# Patient Record
Sex: MALE | Race: WHITE | NOT HISPANIC OR LATINO | Employment: UNEMPLOYED | URBAN - METROPOLITAN AREA
[De-identification: names, ages, dates, MRNs, and addresses within clinical notes are randomized per-mention and may not be internally consistent; named-entity substitution may affect disease eponyms.]

---

## 2023-03-17 LAB
ALANINE AMINOTRANSFERASE (SGPT) (U/L) IN SER/PLAS: 48 U/L (ref 10–52)
ALBUMIN (G/DL) IN SER/PLAS: 4.5 G/DL (ref 3.4–5)
ALKALINE PHOSPHATASE (U/L) IN SER/PLAS: 105 U/L (ref 33–120)
ANION GAP IN SER/PLAS: 16 MMOL/L (ref 10–20)
ASPARTATE AMINOTRANSFERASE (SGOT) (U/L) IN SER/PLAS: 40 U/L (ref 9–39)
BILIRUBIN TOTAL (MG/DL) IN SER/PLAS: 0.8 MG/DL (ref 0–1.2)
CALCIUM (MG/DL) IN SER/PLAS: 9.5 MG/DL (ref 8.6–10.3)
CARBON DIOXIDE, TOTAL (MMOL/L) IN SER/PLAS: 24 MMOL/L (ref 21–32)
CHLORIDE (MMOL/L) IN SER/PLAS: 102 MMOL/L (ref 98–107)
CHOLESTEROL (MG/DL) IN SER/PLAS: 132 MG/DL (ref 0–199)
CHOLESTEROL IN HDL (MG/DL) IN SER/PLAS: 49 MG/DL
CHOLESTEROL/HDL RATIO: 2.7
CREATININE (MG/DL) IN SER/PLAS: 1.02 MG/DL (ref 0.5–1.3)
ESTIMATED AVERAGE GLUCOSE FOR HBA1C: 120 MG/DL
GFR MALE: 88 ML/MIN/1.73M2
GLUCOSE (MG/DL) IN SER/PLAS: 135 MG/DL (ref 74–99)
HEMOGLOBIN A1C/HEMOGLOBIN TOTAL IN BLOOD: 5.8 %
LDL: 46 MG/DL (ref 0–99)
POTASSIUM (MMOL/L) IN SER/PLAS: 3.7 MMOL/L (ref 3.5–5.3)
PROTEIN TOTAL: 8 G/DL (ref 6.4–8.2)
SODIUM (MMOL/L) IN SER/PLAS: 138 MMOL/L (ref 136–145)
TRIGLYCERIDE (MG/DL) IN SER/PLAS: 184 MG/DL (ref 0–149)
UREA NITROGEN (MG/DL) IN SER/PLAS: 14 MG/DL (ref 6–23)
VLDL: 37 MG/DL (ref 0–40)

## 2023-08-16 PROBLEM — E78.1 HYPERTRIGLYCERIDEMIA: Chronic | Status: ACTIVE | Noted: 2023-08-16

## 2023-08-16 PROBLEM — K21.9 GERD (GASTROESOPHAGEAL REFLUX DISEASE): Status: ACTIVE | Noted: 2023-08-16

## 2023-08-16 PROBLEM — I10 BENIGN ESSENTIAL HYPERTENSION: Status: ACTIVE | Noted: 2023-08-16

## 2023-08-16 PROBLEM — R79.89 ELEVATED LFTS: Status: ACTIVE | Noted: 2023-08-16

## 2023-08-16 PROBLEM — R06.02 SHORTNESS OF BREATH: Status: ACTIVE | Noted: 2023-08-16

## 2023-08-16 PROBLEM — E78.5 HYPERLIPIDEMIA: Status: ACTIVE | Noted: 2023-08-16

## 2023-08-16 PROBLEM — I25.10 CAD IN NATIVE ARTERY: Status: ACTIVE | Noted: 2023-08-16

## 2023-08-16 PROBLEM — F41.9 ANXIETY DISORDER: Status: ACTIVE | Noted: 2023-08-16

## 2023-08-16 PROBLEM — R93.1 ELEVATED CORONARY ARTERY CALCIUM SCORE: Status: ACTIVE | Noted: 2023-08-16

## 2023-08-16 PROBLEM — G47.33 OBSTRUCTIVE SLEEP APNEA: Status: ACTIVE | Noted: 2023-08-16

## 2023-08-16 PROBLEM — Z98.61 S/P PTCA (PERCUTANEOUS TRANSLUMINAL CORONARY ANGIOPLASTY): Status: ACTIVE | Noted: 2023-08-16

## 2023-08-16 RX ORDER — LORATADINE 10 MG/1
1 TABLET ORAL DAILY
COMMUNITY
Start: 2017-12-13 | End: 2023-11-10 | Stop reason: WASHOUT

## 2023-08-16 RX ORDER — DAPAGLIFLOZIN 5 MG/1
1 TABLET, FILM COATED ORAL EVERY MORNING
COMMUNITY
End: 2023-12-28 | Stop reason: SDUPTHER

## 2023-08-16 RX ORDER — ATORVASTATIN CALCIUM 80 MG/1
1 TABLET, FILM COATED ORAL NIGHTLY
COMMUNITY
Start: 2018-11-27 | End: 2023-10-16 | Stop reason: SDUPTHER

## 2023-08-16 RX ORDER — LOSARTAN POTASSIUM AND HYDROCHLOROTHIAZIDE 25; 100 MG/1; MG/1
1 TABLET ORAL DAILY
COMMUNITY
Start: 2022-10-11 | End: 2023-10-16 | Stop reason: SDUPTHER

## 2023-08-16 RX ORDER — OMEPRAZOLE 20 MG/1
20 TABLET, DELAYED RELEASE ORAL DAILY
COMMUNITY
End: 2023-11-10 | Stop reason: WASHOUT

## 2023-08-16 RX ORDER — ASPIRIN 81 MG/1
1 TABLET ORAL DAILY
COMMUNITY
Start: 2018-11-27

## 2023-08-16 RX ORDER — FLUTICASONE PROPIONATE 50 MCG
2 SPRAY, SUSPENSION (ML) NASAL DAILY
COMMUNITY
Start: 2020-09-14

## 2023-08-16 RX ORDER — OMEPRAZOLE 40 MG/1
1 CAPSULE, DELAYED RELEASE ORAL DAILY
COMMUNITY
Start: 2018-08-09 | End: 2023-10-23 | Stop reason: WASHOUT

## 2023-08-16 RX ORDER — ALBUTEROL SULFATE 90 UG/1
1-2 AEROSOL, METERED RESPIRATORY (INHALATION)
COMMUNITY
Start: 2018-03-29 | End: 2023-11-10 | Stop reason: WASHOUT

## 2023-08-16 RX ORDER — MULTIVITAMIN
TABLET ORAL
COMMUNITY

## 2023-08-16 RX ORDER — CITALOPRAM 20 MG/1
1 TABLET, FILM COATED ORAL DAILY
COMMUNITY
Start: 2017-11-28

## 2023-08-16 RX ORDER — BUDESONIDE AND FORMOTEROL FUMARATE DIHYDRATE 160; 4.5 UG/1; UG/1
AEROSOL RESPIRATORY (INHALATION)
COMMUNITY

## 2023-08-17 RX ORDER — AMLODIPINE BESYLATE 5 MG/1
5 TABLET ORAL DAILY
COMMUNITY
End: 2023-12-08 | Stop reason: SDUPTHER

## 2023-10-04 ENCOUNTER — APPOINTMENT (OUTPATIENT)
Dept: CARDIOLOGY | Facility: CLINIC | Age: 53
End: 2023-10-04
Payer: COMMERCIAL

## 2023-10-13 ENCOUNTER — LAB (OUTPATIENT)
Dept: LAB | Facility: LAB | Age: 53
End: 2023-10-13
Payer: COMMERCIAL

## 2023-10-13 ENCOUNTER — APPOINTMENT (OUTPATIENT)
Dept: CARDIOLOGY | Facility: CLINIC | Age: 53
End: 2023-10-13
Payer: COMMERCIAL

## 2023-10-16 ENCOUNTER — LAB (OUTPATIENT)
Dept: LAB | Facility: LAB | Age: 53
End: 2023-10-16
Payer: MEDICARE

## 2023-10-16 DIAGNOSIS — R97.20 ELEVATED PROSTATE SPECIFIC ANTIGEN (PSA): ICD-10-CM

## 2023-10-16 DIAGNOSIS — R73.9 HYPERGLYCEMIA, UNSPECIFIED: ICD-10-CM

## 2023-10-16 DIAGNOSIS — I10 BENIGN ESSENTIAL HYPERTENSION: ICD-10-CM

## 2023-10-16 DIAGNOSIS — E78.5 HYPERLIPIDEMIA, UNSPECIFIED HYPERLIPIDEMIA TYPE: Primary | ICD-10-CM

## 2023-10-16 DIAGNOSIS — E78.5 HYPERLIPIDEMIA, UNSPECIFIED: Primary | ICD-10-CM

## 2023-10-16 LAB
ALBUMIN SERPL BCP-MCNC: 4.5 G/DL (ref 3.4–5)
ALP SERPL-CCNC: 118 U/L (ref 33–120)
ALT SERPL W P-5'-P-CCNC: 48 U/L (ref 10–52)
ANION GAP SERPL CALC-SCNC: 16 MMOL/L (ref 10–20)
AST SERPL W P-5'-P-CCNC: 36 U/L (ref 9–39)
BILIRUB SERPL-MCNC: 1 MG/DL (ref 0–1.2)
BUN SERPL-MCNC: 19 MG/DL (ref 6–23)
CALCIUM SERPL-MCNC: 10 MG/DL (ref 8.6–10.3)
CHLORIDE SERPL-SCNC: 99 MMOL/L (ref 98–107)
CHOLEST SERPL-MCNC: 130 MG/DL (ref 0–199)
CHOLESTEROL/HDL RATIO: 2.7
CO2 SERPL-SCNC: 28 MMOL/L (ref 21–32)
CREAT SERPL-MCNC: 0.92 MG/DL (ref 0.5–1.3)
EST. AVERAGE GLUCOSE BLD GHB EST-MCNC: 140 MG/DL
GFR SERPL CREATININE-BSD FRML MDRD: >90 ML/MIN/1.73M*2
GLUCOSE SERPL-MCNC: 96 MG/DL (ref 74–99)
HBA1C MFR BLD: 6.5 %
HDLC SERPL-MCNC: 47.9 MG/DL
LDLC SERPL CALC-MCNC: 44 MG/DL
NON HDL CHOLESTEROL: 82 MG/DL (ref 0–149)
POTASSIUM SERPL-SCNC: 4.3 MMOL/L (ref 3.5–5.3)
PROT SERPL-MCNC: 7.5 G/DL (ref 6.4–8.2)
PSA SERPL-MCNC: 0.34 NG/ML
SODIUM SERPL-SCNC: 139 MMOL/L (ref 136–145)
TRIGL SERPL-MCNC: 189 MG/DL (ref 0–149)
VLDL: 38 MG/DL (ref 0–40)

## 2023-10-16 PROCEDURE — 83036 HEMOGLOBIN GLYCOSYLATED A1C: CPT

## 2023-10-16 PROCEDURE — 80053 COMPREHEN METABOLIC PANEL: CPT

## 2023-10-16 PROCEDURE — 36415 COLL VENOUS BLD VENIPUNCTURE: CPT

## 2023-10-16 PROCEDURE — 80061 LIPID PANEL: CPT

## 2023-10-16 PROCEDURE — 84153 ASSAY OF PSA TOTAL: CPT

## 2023-10-16 RX ORDER — LOSARTAN POTASSIUM AND HYDROCHLOROTHIAZIDE 25; 100 MG/1; MG/1
1 TABLET ORAL DAILY
Qty: 90 TABLET | Refills: 1 | Status: SHIPPED | OUTPATIENT
Start: 2023-10-16 | End: 2024-03-07 | Stop reason: SDUPTHER

## 2023-10-16 RX ORDER — ATORVASTATIN CALCIUM 80 MG/1
80 TABLET, FILM COATED ORAL NIGHTLY
Qty: 90 TABLET | Refills: 1 | Status: SHIPPED | OUTPATIENT
Start: 2023-10-16

## 2023-10-23 DIAGNOSIS — R12 HEARTBURN: Primary | ICD-10-CM

## 2023-10-23 RX ORDER — OMEPRAZOLE 20 MG/1
20 CAPSULE, DELAYED RELEASE ORAL DAILY
Qty: 30 CAPSULE | Refills: 1 | Status: SHIPPED | OUTPATIENT
Start: 2023-10-23 | End: 2023-11-27

## 2023-11-01 PROBLEM — M53.3 SACROILIAC JOINT DYSFUNCTION OF BOTH SIDES: Status: ACTIVE | Noted: 2017-11-22

## 2023-11-01 PROBLEM — S72.001A FRACTURE OF FEMORAL NECK, RIGHT (MULTI): Status: ACTIVE | Noted: 2017-12-04

## 2023-11-01 PROBLEM — M54.41 CHRONIC BILATERAL LOW BACK PAIN WITH SCIATICA: Status: ACTIVE | Noted: 2017-11-22

## 2023-11-01 PROBLEM — Z98.61 STATUS POST PERCUTANEOUS TRANSLUMINAL CORONARY ANGIOPLASTY: Status: ACTIVE | Noted: 2019-01-02

## 2023-11-01 PROBLEM — M54.42 CHRONIC BILATERAL LOW BACK PAIN WITH SCIATICA: Status: ACTIVE | Noted: 2017-11-22

## 2023-11-01 PROBLEM — E66.01 OBESITY, CLASS III, BMI 40-49.9 (MORBID OBESITY) (MULTI): Status: ACTIVE | Noted: 2017-12-07

## 2023-11-01 PROBLEM — I25.10 ARTERIOSCLEROSIS OF CORONARY ARTERY: Status: ACTIVE | Noted: 2018-11-08

## 2023-11-01 PROBLEM — M87.052 AVASCULAR NECROSIS OF LEFT FEMORAL HEAD (MULTI): Status: ACTIVE | Noted: 2017-11-22

## 2023-11-01 PROBLEM — G89.29 CHRONIC BILATERAL LOW BACK PAIN WITH SCIATICA: Status: ACTIVE | Noted: 2017-11-22

## 2023-11-01 PROBLEM — K21.9 GASTROESOPHAGEAL REFLUX DISEASE: Status: ACTIVE | Noted: 2018-11-08

## 2023-11-01 PROBLEM — E66.813 OBESITY, CLASS III, BMI 40-49.9 (MORBID OBESITY): Status: ACTIVE | Noted: 2017-12-07

## 2023-11-01 PROBLEM — Z96.642 STATUS POST LEFT HIP REPLACEMENT: Status: ACTIVE | Noted: 2018-11-21

## 2023-11-01 PROBLEM — G47.33 OBSTRUCTIVE SLEEP APNEA SYNDROME: Status: ACTIVE | Noted: 2018-09-06

## 2023-11-01 PROBLEM — M54.40 CHRONIC BILATERAL LOW BACK PAIN WITH SCIATICA: Status: ACTIVE | Noted: 2017-11-22

## 2023-11-01 RX ORDER — CLONIDINE HYDROCHLORIDE 0.1 MG/1
0.1 TABLET ORAL 2 TIMES DAILY
COMMUNITY
Start: 2017-12-08 | End: 2024-05-30 | Stop reason: WASHOUT

## 2023-11-01 RX ORDER — VERAPAMIL HYDROCHLORIDE 240 MG/1
TABLET, FILM COATED, EXTENDED RELEASE ORAL
COMMUNITY
Start: 2023-01-03 | End: 2023-11-10 | Stop reason: WASHOUT

## 2023-11-01 RX ORDER — LOSARTAN POTASSIUM 100 MG/1
TABLET ORAL
COMMUNITY
Start: 2018-08-12 | End: 2023-11-10 | Stop reason: WASHOUT

## 2023-11-01 RX ORDER — DOCUSATE SODIUM 100 MG/1
100 CAPSULE, LIQUID FILLED ORAL 2 TIMES DAILY
COMMUNITY
Start: 2018-02-08 | End: 2023-11-10 | Stop reason: WASHOUT

## 2023-11-01 RX ORDER — HYDROCHLOROTHIAZIDE 25 MG/1
25 TABLET ORAL
COMMUNITY
End: 2023-11-10 | Stop reason: WASHOUT

## 2023-11-09 ASSESSMENT — ENCOUNTER SYMPTOMS
ALTERED MENTAL STATUS: 0
PALPITATIONS: 0
HEMATOCHEZIA: 0
HEMATURIA: 0
ORTHOPNEA: 0
VOMITING: 0
NEAR-SYNCOPE: 0
NAUSEA: 0
WHEEZING: 0
CHILLS: 0
FEVER: 0
SHORTNESS OF BREATH: 0
DYSPNEA ON EXERTION: 1
COUGH: 0
IRREGULAR HEARTBEAT: 0
SYNCOPE: 0

## 2023-11-09 NOTE — PATIENT INSTRUCTIONS
Recommend Mediterranean style of eating  Increase amlodipine 10 mg daily  Follow-up with Dr. Scherer or LOS Bingham in 3 months  If you have any questions or cardiac concerns, please call our office at [phone].

## 2023-11-09 NOTE — PROGRESS NOTES
"Chief Complaint/Reason for Visit:  Follow-up 6 month cardiovascular follow up    History Of Present Illness:    Beau Jackson is a 53 y.o. male that presents to the office for 6 month follow up.  Taking medications as prescribed.     PMH significant for CAD s/p PCI/stent RCA 11/8/18, hyperlipidemia, HTN and STEFANIA >uses CPAP.  Former smoker, quit in 2016.     He has chronic BUENO that is at baseline.  He reports that his BP at home has been \"steady\", but he was not able to give me any numbers.    During last office visit he was started on amlodipine.  He is unsure if he picked this up or started it.  MA called [facility] in [place] and he did  90 day supply on 9/19/23.  He reports that his girlfriend sets out his medications.  Reinforced to make up a good medication list with doses and frequency to keep on him.    Past Medical History:  He has a past medical history of Personal history of other diseases of the circulatory system.    Past Surgical History:  He has a past surgical history that includes Total hip arthroplasty (11/02/2018) and Other surgical history (09/09/2019).      Social History:  He reports that he has quit smoking. His smoking use included cigarettes. He has never used smokeless tobacco. No history on file for alcohol use and drug use.    Family History:  Family History   Problem Relation Name Age of Onset    Coronary artery disease Father          Allergies:  Patient has no known allergies.    Review of Systems   Constitutional: Negative for chills and fever.   Cardiovascular:  Positive for dyspnea on exertion (chronic). Negative for chest pain, irregular heartbeat, leg swelling, near-syncope, orthopnea, palpitations and syncope.   Respiratory:  Negative for cough, shortness of breath and wheezing.    Gastrointestinal:  Negative for hematochezia, melena, nausea and vomiting.   Genitourinary:  Negative for hematuria.   Psychiatric/Behavioral:  Negative for altered mental status.        Objective  "     Vitals reviewed.   Constitutional:       Appearance: Healthy appearance.   Pulmonary:      Effort: Pulmonary effort is normal.      Breath sounds: Normal breath sounds.   Cardiovascular:      PMI at left midclavicular line. Normal rate. Regular rhythm. S1 with normal intensity. S2 with normal intensity.       Murmurs: There is no murmur.   Edema:     Peripheral edema absent.   Abdominal:      General: Bowel sounds are normal.      Comments: +obese   Skin:     General: Skin is warm and dry.   Psychiatric:         Attention and Perception: Attention normal.         Mood and Affect: Mood normal.         Behavior: Behavior is cooperative.         Current Outpatient Medications   Medication Instructions    amLODIPine (NORVASC) 5 mg, oral, Daily    amLODIPine (NORVASC) 10 mg, oral, Daily    aspirin 81 mg EC tablet 1 tablet, oral, Daily    atorvastatin (LIPITOR) 80 mg, oral, Nightly, For: Hyperlipidemia    budesonide-formoteroL (Symbicort) 160-4.5 mcg/actuation inhaler inhalation    calcium carbonate-vitamin D3 600 mg-20 mcg (800 unit) tablet,chewable oral, 2 times daily    citalopram (CeleXA) 20 mg tablet 1 tablet, oral, Daily, For: Anxiety disorder    cloNIDine (CATAPRES) 0.1 mg, oral, 2 times daily    dapagliflozin propanediol (Farxiga) 5 mg 1 tablet, oral, Every morning, For: Benign essential hypertension, CAD in native artery     fluticasone (Flonase Allergy Relief) 50 mcg/actuation nasal spray 2 sprays, Each Nostril, Daily    losartan (Cozaar) 100 mg tablet oral, Daily RT    losartan-hydrochlorothiazide (Hyzaar) 100-25 mg tablet 1 tablet, oral, Daily, For: Benign essential hypertension     multivitamin (Daily Multi-Vitamin) tablet oral    omeprazole (PRILOSEC) 20 mg, oral, Daily        Last Labs:  CBC -  Lab Results   Component Value Date    WBC 8.3 08/31/2021    HGB 16.3 08/31/2021    HCT 45.9 08/31/2021    MCV 86 08/31/2021     08/31/2021       CMP -  Lab Results   Component Value Date    CALCIUM 10.0  "10/16/2023    PROT 7.5 10/16/2023    ALBUMIN 4.5 10/16/2023    AST 36 10/16/2023    ALT 48 10/16/2023    ALKPHOS 118 10/16/2023    BILITOT 1.0 10/16/2023       LIPID PANEL -   Lab Results   Component Value Date    CHOL 130 10/16/2023    TRIG 189 (H) 10/16/2023    HDL 47.9 10/16/2023    CHHDL 2.7 10/16/2023    LDLF 46 03/17/2023    VLDL 38 10/16/2023    NHDL 82 10/16/2023     Lab Results   Component Value Date    LDLCALC 44 10/16/2023       RENAL FUNCTION PANEL -   Lab Results   Component Value Date    GLUCOSE 96 10/16/2023     10/16/2023    K 4.3 10/16/2023    CL 99 10/16/2023    CO2 28 10/16/2023    ANIONGAP 16 10/16/2023    BUN 19 10/16/2023    CREATININE 0.92 10/16/2023    GFRMALE 88 03/17/2023    CALCIUM 10.0 10/16/2023    ALBUMIN 4.5 10/16/2023        Lab Results   Component Value Date    HGBA1C 6.5 (H) 10/16/2023       No results found for: \"TSH\"    No results found for this or any previous visit.     Last Cardiology Tests:    [facility] 11/8/18 showed 75% stenosis ostial RCA. He is s/p PCI/stent RCA.     Visit Vitals  BP (!) 140/100 (BP Location: Left arm, Patient Position: Sitting)   Pulse 103   Ht 1.651 m (5' 5\")   Wt 132 kg (291 lb)   SpO2 94%   BMI 48.42 kg/m²   Smoking Status Former   BSA 2.46 m²       Assessment/Plan   The primary encounter diagnosis was CAD in native artery. Diagnoses of Hyperlipidemia, unspecified hyperlipidemia type, Benign essential hypertension, and Hypertriglyceridemia were also pertinent to this visit.    1. CAD s/p PCI of RCA Nov. 2018  Continue aspirin 81 mg daily. Brilinta stopped previously.  Continue atorvastatin 80 mg daily  Counselled about diet and exercise     2. HTN  Elevated  Increase amlodipine 10 mg daily  Continue HCTZ 25 mg daily and losartan 100 mg daily      3. Hyperlipidemia, hypertriglyceridemia  Continue atorvastatin 80 mg daily  Goal LDL >70. He is at goal.   During last office visit he was started on Farxiga d/t hypertriglyceridemia and hyperglycemia with " hemoglobin A1c 6.9%    Otilia Gilliam, APRN-CNP

## 2023-11-10 ENCOUNTER — OFFICE VISIT (OUTPATIENT)
Dept: CARDIOLOGY | Facility: CLINIC | Age: 53
End: 2023-11-10
Payer: MEDICARE

## 2023-11-10 VITALS
BODY MASS INDEX: 48.48 KG/M2 | WEIGHT: 291 LBS | DIASTOLIC BLOOD PRESSURE: 100 MMHG | OXYGEN SATURATION: 94 % | SYSTOLIC BLOOD PRESSURE: 140 MMHG | HEART RATE: 103 BPM | HEIGHT: 65 IN

## 2023-11-10 DIAGNOSIS — E78.5 HYPERLIPIDEMIA, UNSPECIFIED HYPERLIPIDEMIA TYPE: ICD-10-CM

## 2023-11-10 DIAGNOSIS — I10 BENIGN ESSENTIAL HYPERTENSION: ICD-10-CM

## 2023-11-10 DIAGNOSIS — E78.1 HYPERTRIGLYCERIDEMIA: Chronic | ICD-10-CM

## 2023-11-10 DIAGNOSIS — I25.10 CAD IN NATIVE ARTERY: Primary | ICD-10-CM

## 2023-11-10 PROCEDURE — 3080F DIAST BP >= 90 MM HG: CPT | Performed by: NURSE PRACTITIONER

## 2023-11-10 PROCEDURE — 3077F SYST BP >= 140 MM HG: CPT | Performed by: NURSE PRACTITIONER

## 2023-11-10 PROCEDURE — 99213 OFFICE O/P EST LOW 20 MIN: CPT | Performed by: NURSE PRACTITIONER

## 2023-11-10 PROCEDURE — 1036F TOBACCO NON-USER: CPT | Performed by: NURSE PRACTITIONER

## 2023-11-10 RX ORDER — AMLODIPINE BESYLATE 10 MG/1
10 TABLET ORAL DAILY
Qty: 30 TABLET | Refills: 3 | Status: SHIPPED | OUTPATIENT
Start: 2023-11-10 | End: 2023-12-08 | Stop reason: SDUPTHER

## 2023-11-22 DIAGNOSIS — R12 HEARTBURN: ICD-10-CM

## 2023-11-27 RX ORDER — OMEPRAZOLE 20 MG/1
20 CAPSULE, DELAYED RELEASE ORAL DAILY
Qty: 90 CAPSULE | Refills: 0 | Status: SHIPPED | OUTPATIENT
Start: 2023-11-27 | End: 2024-04-11 | Stop reason: SDUPTHER

## 2023-12-08 DIAGNOSIS — I10 BENIGN ESSENTIAL HYPERTENSION: ICD-10-CM

## 2023-12-08 RX ORDER — AMLODIPINE BESYLATE 10 MG/1
10 TABLET ORAL DAILY
Qty: 90 TABLET | Refills: 3 | Status: SHIPPED | OUTPATIENT
Start: 2023-12-08 | End: 2024-12-07

## 2023-12-08 NOTE — TELEPHONE ENCOUNTER
Last appointment: 11/10/23 with Otilia Gilliam  Next appointment: 2/13/24 with Dr. Scherer  Labs labs:  10/16/23

## 2023-12-08 NOTE — TELEPHONE ENCOUNTER
----- Message from Sasha Wagner sent at 12/8/2023  1:02 PM EST -----  Needs a refill of amlodipine 10 mg sent to [facility] in [place]. :)

## 2023-12-28 DIAGNOSIS — I10 BENIGN ESSENTIAL HYPERTENSION: ICD-10-CM

## 2023-12-28 DIAGNOSIS — I25.10 CAD IN NATIVE ARTERY: Primary | ICD-10-CM

## 2023-12-28 DIAGNOSIS — R73.09 ELEVATED HEMOGLOBIN A1C: ICD-10-CM

## 2023-12-28 DIAGNOSIS — E78.1 HYPERTRIGLYCERIDEMIA: Primary | Chronic | ICD-10-CM

## 2023-12-28 RX ORDER — DAPAGLIFLOZIN 5 MG/1
5 TABLET, FILM COATED ORAL EVERY MORNING
Qty: 90 TABLET | Refills: 2 | Status: SHIPPED | OUTPATIENT
Start: 2023-12-28 | End: 2024-09-23

## 2023-12-28 NOTE — TELEPHONE ENCOUNTER
Patient left a voicemail request refill of Farxiga 5 mg to [facility] in [place]

## 2023-12-28 NOTE — TELEPHONE ENCOUNTER
Last appointment: 11/10/23 with Otilia Gilliam  Next appointment: 2/13/24 with Dr. Scherer  Labs labs: 10/16/23  3/13/23 last refilled 90 days 3 refills--I called pharmacy and medication is ready to , but there are no further refills left now.

## 2024-02-07 RX ORDER — AMLODIPINE BESYLATE 5 MG/1
5 TABLET ORAL
COMMUNITY
Start: 2023-12-15 | End: 2024-03-07 | Stop reason: WASHOUT

## 2024-02-13 ENCOUNTER — OFFICE VISIT (OUTPATIENT)
Dept: CARDIOLOGY | Facility: CLINIC | Age: 54
End: 2024-02-13
Payer: MEDICARE

## 2024-02-13 DIAGNOSIS — I25.10 CAD IN NATIVE ARTERY: ICD-10-CM

## 2024-02-13 DIAGNOSIS — E78.1 HYPERTRIGLYCERIDEMIA: Chronic | ICD-10-CM

## 2024-02-13 DIAGNOSIS — E78.5 HYPERLIPIDEMIA, UNSPECIFIED HYPERLIPIDEMIA TYPE: ICD-10-CM

## 2024-02-13 DIAGNOSIS — I10 BENIGN ESSENTIAL HYPERTENSION: ICD-10-CM

## 2024-02-14 NOTE — PROGRESS NOTES
"Counseling:  The patient was counseled regarding diagnostic results, instructions for management, risk factor reductions, prognosis, patient and family education, impressions, risks and benefits of treatment options and importance of compliance with treatment.      Chief Complaint:   The patient presents today for 4-month followup of HTN, CAD and hyperlipidemia.     History Of Present Illness:    Beau Jackson is a 53 year old male patient who presents today in the company of his wife for 4-month followup of HTN, CAD and hyperlipidemia. His PMH is significant for CAD s/p PCI/stent RCA 11/08/2018, hyperlipidemia, GERD, HTN and STEFANIA (CPAP). Over the past 4 months, the patient states that he has done well from a cardiac standpoint. He denies any CP, chest discomfort or SOB. BP has been stable. EKG today is stable with no acute changes. The patient is compliant with his prescribed medications; although, he cannot if he is still on the losartan-HCTZ and will verify this at home.      Last Recorded Vitals:  Vitals:    03/07/24 1159   BP: 130/90   BP Location: Left arm   Pulse: 94   Weight: 132 kg (291 lb)   Height: 1.651 m (5' 5\")       Past Surgical History:  He has a past surgical history that includes Total hip arthroplasty (11/02/2018) and Other surgical history (09/09/2019).      Social History:  He reports that he has quit smoking. His smoking use included cigarettes. He has never used smokeless tobacco. No history on file for alcohol use and drug use.    Family History:  Family History   Problem Relation Name Age of Onset    Coronary artery disease Father          Allergies:  Patient has no known allergies.    Outpatient Medications:  Current Outpatient Medications   Medication Instructions    amLODIPine (NORVASC) 10 mg, oral, Daily    amLODIPine (NORVASC) 5 mg    aspirin 81 mg EC tablet 1 tablet, oral, Daily    atorvastatin (LIPITOR) 80 mg, oral, Nightly, For: Hyperlipidemia    budesonide-formoteroL (Symbicort) " 160-4.5 mcg/actuation inhaler inhalation    calcium carbonate-vitamin D3 600 mg-20 mcg (800 unit) tablet,chewable oral, 2 times daily    citalopram (CeleXA) 20 mg tablet 1 tablet, oral, Daily, For: Anxiety disorder    cloNIDine (CATAPRES) 0.1 mg, oral, 2 times daily    dapagliflozin propanediol (FARXIGA) 5 mg, oral, Every morning, For: Benign essential hypertension, CAD in native artery    fluticasone (Flonase Allergy Relief) 50 mcg/actuation nasal spray 2 sprays, Each Nostril, Daily    losartan-hydrochlorothiazide (Hyzaar) 100-25 mg tablet 1 tablet, oral, Daily, For: Benign essential hypertension     multivitamin (Daily Multi-Vitamin) tablet oral    omeprazole (PRILOSEC) 20 mg, oral, Daily     Review of Systems   All other systems reviewed and are negative.     Physical Exam:  Constitutional:       Appearance: Healthy appearance. Not in distress.   Neck:      Vascular: No JVR. JVD normal.   Pulmonary:      Effort: Pulmonary effort is normal.      Breath sounds: Normal breath sounds. No wheezing. No rhonchi. No rales.   Chest:      Chest wall: Not tender to palpatation.   Cardiovascular:      PMI at left midclavicular line. Normal rate. Regular rhythm. Normal S1. Normal S2.       Murmurs: There is no murmur.      No gallop.  No click. No rub.   Pulses:     Intact distal pulses.   Edema:     Peripheral edema absent.   Abdominal:      General: Bowel sounds are normal.      Palpations: Abdomen is soft.      Tenderness: There is no abdominal tenderness.   Musculoskeletal: Normal range of motion.         General: No tenderness. Skin:     General: Skin is warm and dry.   Neurological:      General: No focal deficit present.      Mental Status: Alert and oriented to person, place and time.          Last Labs:  CBC -  Lab Results   Component Value Date    WBC 8.3 08/31/2021    HGB 16.3 08/31/2021    HCT 45.9 08/31/2021    MCV 86 08/31/2021     08/31/2021       CMP -  Lab Results   Component Value Date    CALCIUM 10.0  10/16/2023    PROT 7.5 10/16/2023    ALBUMIN 4.5 10/16/2023    AST 36 10/16/2023    ALT 48 10/16/2023    ALKPHOS 118 10/16/2023    BILITOT 1.0 10/16/2023       LIPID PANEL -   Lab Results   Component Value Date    CHOL 130 10/16/2023    TRIG 189 (H) 10/16/2023    HDL 47.9 10/16/2023    CHHDL 2.7 10/16/2023    LDLF 46 03/17/2023    VLDL 38 10/16/2023    NHDL 82 10/16/2023       RENAL FUNCTION PANEL -   Lab Results   Component Value Date    GLUCOSE 96 10/16/2023     10/16/2023    K 4.3 10/16/2023    CL 99 10/16/2023    CO2 28 10/16/2023    ANIONGAP 16 10/16/2023    BUN 19 10/16/2023    CREATININE 0.92 10/16/2023    GFRMALE 88 03/17/2023    CALCIUM 10.0 10/16/2023    ALBUMIN 4.5 10/16/2023        Lab Results   Component Value Date    HGBA1C 6.5 (H) 10/16/2023       Last Cardiology Tests:  11/08/2018 - Left Heart Catheterization  1. Single vessel coronary artery disease without proximal left anterior descending involvement.  2. Culprit vessel(s): right coronary artery.  3. Successful PTCA/stent of RCA.     Lab review: I have personally reviewed the laboratory result(s).    Assessment/Plan   1) CAD s/p PCI of RCA Nov. 2018  Continue aspirin 81 mg daily, atorvastatin 80 mg daily  Brilinta previously discontinued   Counselled about diet and exercise  Denies CP, chest discomfort or SOB  EKG stable  Check CBC, CMP, Lipid Panel, TSH, A1c  Followup with Otilia Gilliam NP, in 6 months     2) HTN  Stable  Continue amlodipine 10 mg daily, clonidine 0.1 mg BID, losartan-HCTZ 100-25 mg daily  Patient could not recall if he is still on losartan-HCTZ  - will verify at home  Check CMP  Followup with Otilia Gilliam NP, in 6 months     3) Hyperlipidemia  Goal LDL <70  Continue atorvastatin 80 mg daily, Farxiga 5 mg daily   Lipid panel 10/16/2023 with LDL of 44; at goal  A1c 10/16/2023 of 6.5; up from 5.8  Advised on Mediterranean style diet   Check Lipid Panel and A1c  Followup with Otilia Gilliam NP, in 6 months      Scribe  Attestation  By signing my name below, I, Ofelia Carmen   attest that this documentation has been prepared under the direction and in the presence of Steven Scherer MD.

## 2024-03-07 ENCOUNTER — OFFICE VISIT (OUTPATIENT)
Dept: CARDIOLOGY | Facility: CLINIC | Age: 54
End: 2024-03-07
Payer: MEDICARE

## 2024-03-07 VITALS
WEIGHT: 291 LBS | HEIGHT: 65 IN | SYSTOLIC BLOOD PRESSURE: 130 MMHG | BODY MASS INDEX: 48.48 KG/M2 | DIASTOLIC BLOOD PRESSURE: 90 MMHG | HEART RATE: 94 BPM

## 2024-03-07 DIAGNOSIS — I10 BENIGN ESSENTIAL HYPERTENSION: ICD-10-CM

## 2024-03-07 DIAGNOSIS — I25.10 CAD IN NATIVE ARTERY: Primary | ICD-10-CM

## 2024-03-07 DIAGNOSIS — E11.9 DIABETES MELLITUS TYPE II, NON INSULIN DEPENDENT (MULTI): ICD-10-CM

## 2024-03-07 PROCEDURE — 93000 ELECTROCARDIOGRAM COMPLETE: CPT | Performed by: INTERNAL MEDICINE

## 2024-03-07 PROCEDURE — 99213 OFFICE O/P EST LOW 20 MIN: CPT | Performed by: INTERNAL MEDICINE

## 2024-03-07 PROCEDURE — 3080F DIAST BP >= 90 MM HG: CPT | Performed by: INTERNAL MEDICINE

## 2024-03-07 PROCEDURE — 3075F SYST BP GE 130 - 139MM HG: CPT | Performed by: INTERNAL MEDICINE

## 2024-03-07 PROCEDURE — 1036F TOBACCO NON-USER: CPT | Performed by: INTERNAL MEDICINE

## 2024-03-07 RX ORDER — LOSARTAN POTASSIUM AND HYDROCHLOROTHIAZIDE 25; 100 MG/1; MG/1
1 TABLET ORAL DAILY
Qty: 90 TABLET | Refills: 1 | Status: SHIPPED | OUTPATIENT
Start: 2024-03-07

## 2024-03-07 NOTE — PATIENT INSTRUCTIONS
Continue all current medications as prescribed.  Your A1c has gone up from 5.8 to 6.5. Watch carbohydrate intake (rice, potatoes, pasta, bread, ice-cream all within moderation); increase greens, veggies, fiber, lean protein (fish, turkey, chicken; baked/boiled/grilled, not fried) and fruit.   Please have blood work drawn at your earliest convenience.  Followup with Otilia Gilliam NP, in 6 months.      If you have any questions or cardiac concerns, please call our office at [phone].

## 2024-03-07 NOTE — LETTER
"March 7, 2024     CAROLINE Vasquez MD  [address] [facility] [address]    Patient: Beau Jackson   YOB: 1970   Date of Visit: 3/7/2024       Dear Dr. CAROLINE Vasquez MD:    Thank you for referring Beau Jackson to me for evaluation. Below are my notes for this consultation.  If you have questions, please do not hesitate to call me. I look forward to following your patient along with you.       Sincerely,     Steven Scherer MD      CC: No Recipients  ______________________________________________________________________________________    Counseling:  The patient was counseled regarding diagnostic results, instructions for management, risk factor reductions, prognosis, patient and family education, impressions, risks and benefits of treatment options and importance of compliance with treatment.      Chief Complaint:   The patient presents today for 4-month followup of HTN, CAD and hyperlipidemia.     History Of Present Illness:    Beau Jackson is a 53 year old male patient who presents today in the company of his wife for 4-month followup of HTN, CAD and hyperlipidemia. His PMH is significant for CAD s/p PCI/stent RCA 11/08/2018, hyperlipidemia, GERD, HTN and STEFANIA (CPAP). Over the past 4 months, the patient states that he has done well from a cardiac standpoint. He denies any CP, chest discomfort or SOB. BP has been stable. EKG today is stable with no acute changes. The patient is compliant with his prescribed medications; although, he cannot if he is still on the losartan-HCTZ and will verify this at home.      Last Recorded Vitals:  Vitals:    03/07/24 1159   BP: 130/90   BP Location: Left arm   Pulse: 94   Weight: 132 kg (291 lb)   Height: 1.651 m (5' 5\")       Past Surgical History:  He has a past surgical history that includes Total hip arthroplasty (11/02/2018) and Other surgical history (09/09/2019).      Social History:  He reports that he has quit smoking. His smoking use included cigarettes. He has " never used smokeless tobacco. No history on file for alcohol use and drug use.    Family History:  Family History   Problem Relation Name Age of Onset   • Coronary artery disease Father          Allergies:  Patient has no known allergies.    Outpatient Medications:  Current Outpatient Medications   Medication Instructions   • amLODIPine (NORVASC) 10 mg, oral, Daily   • amLODIPine (NORVASC) 5 mg   • aspirin 81 mg EC tablet 1 tablet, oral, Daily   • atorvastatin (LIPITOR) 80 mg, oral, Nightly, For: Hyperlipidemia   • budesonide-formoteroL (Symbicort) 160-4.5 mcg/actuation inhaler inhalation   • calcium carbonate-vitamin D3 600 mg-20 mcg (800 unit) tablet,chewable oral, 2 times daily   • citalopram (CeleXA) 20 mg tablet 1 tablet, oral, Daily, For: Anxiety disorder   • cloNIDine (CATAPRES) 0.1 mg, oral, 2 times daily   • dapagliflozin propanediol (FARXIGA) 5 mg, oral, Every morning, For: Benign essential hypertension, CAD in native artery   • fluticasone (Flonase Allergy Relief) 50 mcg/actuation nasal spray 2 sprays, Each Nostril, Daily   • losartan-hydrochlorothiazide (Hyzaar) 100-25 mg tablet 1 tablet, oral, Daily, For: Benign essential hypertension    • multivitamin (Daily Multi-Vitamin) tablet oral   • omeprazole (PRILOSEC) 20 mg, oral, Daily     Review of Systems   All other systems reviewed and are negative.     Physical Exam:  Constitutional:       Appearance: Healthy appearance. Not in distress.   Neck:      Vascular: No JVR. JVD normal.   Pulmonary:      Effort: Pulmonary effort is normal.      Breath sounds: Normal breath sounds. No wheezing. No rhonchi. No rales.   Chest:      Chest wall: Not tender to palpatation.   Cardiovascular:      PMI at left midclavicular line. Normal rate. Regular rhythm. Normal S1. Normal S2.       Murmurs: There is no murmur.      No gallop.  No click. No rub.   Pulses:     Intact distal pulses.   Edema:     Peripheral edema absent.   Abdominal:      General: Bowel sounds are  normal.      Palpations: Abdomen is soft.      Tenderness: There is no abdominal tenderness.   Musculoskeletal: Normal range of motion.         General: No tenderness. Skin:     General: Skin is warm and dry.   Neurological:      General: No focal deficit present.      Mental Status: Alert and oriented to person, place and time.          Last Labs:  CBC -  Lab Results   Component Value Date    WBC 8.3 08/31/2021    HGB 16.3 08/31/2021    HCT 45.9 08/31/2021    MCV 86 08/31/2021     08/31/2021       CMP -  Lab Results   Component Value Date    CALCIUM 10.0 10/16/2023    PROT 7.5 10/16/2023    ALBUMIN 4.5 10/16/2023    AST 36 10/16/2023    ALT 48 10/16/2023    ALKPHOS 118 10/16/2023    BILITOT 1.0 10/16/2023       LIPID PANEL -   Lab Results   Component Value Date    CHOL 130 10/16/2023    TRIG 189 (H) 10/16/2023    HDL 47.9 10/16/2023    CHHDL 2.7 10/16/2023    LDLF 46 03/17/2023    VLDL 38 10/16/2023    NHDL 82 10/16/2023       RENAL FUNCTION PANEL -   Lab Results   Component Value Date    GLUCOSE 96 10/16/2023     10/16/2023    K 4.3 10/16/2023    CL 99 10/16/2023    CO2 28 10/16/2023    ANIONGAP 16 10/16/2023    BUN 19 10/16/2023    CREATININE 0.92 10/16/2023    GFRMALE 88 03/17/2023    CALCIUM 10.0 10/16/2023    ALBUMIN 4.5 10/16/2023        Lab Results   Component Value Date    HGBA1C 6.5 (H) 10/16/2023       Last Cardiology Tests:  11/08/2018 - Left Heart Catheterization  1. Single vessel coronary artery disease without proximal left anterior descending involvement.  2. Culprit vessel(s): right coronary artery.  3. Successful PTCA/stent of RCA.     Lab review: I have personally reviewed the laboratory result(s).    Assessment/Plan  1) CAD s/p PCI of RCA Nov. 2018  Continue aspirin 81 mg daily, atorvastatin 80 mg daily  Brilinta previously discontinued   Counselled about diet and exercise  Denies CP, chest discomfort or SOB  EKG stable  Check CBC, CMP, Lipid Panel, TSH, A1c  Followup with Otilia  LOS Gilliam, in 6 months     2) HTN  Stable  Continue amlodipine 10 mg daily, clonidine 0.1 mg BID, losartan-HCTZ 100-25 mg daily  Patient could not recall if he is still on losartan-HCTZ  - will verify at home  Check CMP  Followup with Otilia Gilliam NP, in 6 months     3) Hyperlipidemia  Goal LDL <70  Continue atorvastatin 80 mg daily, Farxiga 5 mg daily   Lipid panel 10/16/2023 with LDL of 44; at goal  A1c 10/16/2023 of 6.5; up from 5.8  Advised on Mediterranean style diet   Check Lipid Panel and A1c  Followup with Otilia Gilliam NP, in 6 months      Scribe Attestation  By signing my name below, I, Suha Wild Scrpierre   attest that this documentation has been prepared under the direction and in the presence of Steven Scherer MD.

## 2024-03-12 ENCOUNTER — LAB (OUTPATIENT)
Dept: LAB | Facility: LAB | Age: 54
End: 2024-03-12
Payer: MEDICARE

## 2024-03-12 DIAGNOSIS — I25.10 CAD IN NATIVE ARTERY: ICD-10-CM

## 2024-03-12 DIAGNOSIS — I10 BENIGN ESSENTIAL HYPERTENSION: ICD-10-CM

## 2024-03-12 DIAGNOSIS — R73.09 OTHER ABNORMAL GLUCOSE: ICD-10-CM

## 2024-03-12 DIAGNOSIS — E11.9 DIABETES MELLITUS TYPE II, NON INSULIN DEPENDENT (MULTI): ICD-10-CM

## 2024-03-12 DIAGNOSIS — R73.01 IMPAIRED FASTING GLUCOSE: Primary | ICD-10-CM

## 2024-03-12 LAB
ALBUMIN SERPL BCP-MCNC: 4.3 G/DL (ref 3.4–5)
ALP SERPL-CCNC: 107 U/L (ref 33–120)
ALT SERPL W P-5'-P-CCNC: 35 U/L (ref 10–52)
ANION GAP SERPL CALC-SCNC: 16 MMOL/L (ref 10–20)
AST SERPL W P-5'-P-CCNC: 33 U/L (ref 9–39)
BASOPHILS # BLD AUTO: 0.04 X10*3/UL (ref 0–0.1)
BASOPHILS NFR BLD AUTO: 0.6 %
BILIRUB SERPL-MCNC: 0.9 MG/DL (ref 0–1.2)
BUN SERPL-MCNC: 14 MG/DL (ref 6–23)
CALCIUM SERPL-MCNC: 9.6 MG/DL (ref 8.6–10.3)
CHLORIDE SERPL-SCNC: 100 MMOL/L (ref 98–107)
CHOLEST SERPL-MCNC: 124 MG/DL (ref 0–199)
CHOLESTEROL/HDL RATIO: 3.1
CO2 SERPL-SCNC: 25 MMOL/L (ref 21–32)
CREAT SERPL-MCNC: 0.85 MG/DL (ref 0.5–1.3)
EGFRCR SERPLBLD CKD-EPI 2021: >90 ML/MIN/1.73M*2
EOSINOPHIL # BLD AUTO: 0.27 X10*3/UL (ref 0–0.7)
EOSINOPHIL NFR BLD AUTO: 4.3 %
ERYTHROCYTE [DISTWIDTH] IN BLOOD BY AUTOMATED COUNT: 13.4 % (ref 11.5–14.5)
EST. AVERAGE GLUCOSE BLD GHB EST-MCNC: 151 MG/DL
GLUCOSE P FAST SERPL-MCNC: 144 MG/DL (ref 74–99)
GLUCOSE SERPL-MCNC: 144 MG/DL (ref 74–99)
HBA1C MFR BLD: 6.9 %
HCT VFR BLD AUTO: 50.1 % (ref 41–52)
HDLC SERPL-MCNC: 39.4 MG/DL
HGB BLD-MCNC: 17.5 G/DL (ref 13.5–17.5)
IMM GRANULOCYTES # BLD AUTO: 0.04 X10*3/UL (ref 0–0.7)
IMM GRANULOCYTES NFR BLD AUTO: 0.6 % (ref 0–0.9)
LDLC SERPL CALC-MCNC: 28 MG/DL
LYMPHOCYTES # BLD AUTO: 1.39 X10*3/UL (ref 1.2–4.8)
LYMPHOCYTES NFR BLD AUTO: 22.3 %
MCH RBC QN AUTO: 30.7 PG (ref 26–34)
MCHC RBC AUTO-ENTMCNC: 34.9 G/DL (ref 32–36)
MCV RBC AUTO: 88 FL (ref 80–100)
MONOCYTES # BLD AUTO: 0.61 X10*3/UL (ref 0.1–1)
MONOCYTES NFR BLD AUTO: 9.8 %
NEUTROPHILS # BLD AUTO: 3.87 X10*3/UL (ref 1.2–7.7)
NEUTROPHILS NFR BLD AUTO: 62.4 %
NON HDL CHOLESTEROL: 85 MG/DL (ref 0–149)
NRBC BLD-RTO: 0 /100 WBCS (ref 0–0)
PLATELET # BLD AUTO: 328 X10*3/UL (ref 150–450)
POTASSIUM SERPL-SCNC: 3.8 MMOL/L (ref 3.5–5.3)
PROT SERPL-MCNC: 7.5 G/DL (ref 6.4–8.2)
RBC # BLD AUTO: 5.7 X10*6/UL (ref 4.5–5.9)
SODIUM SERPL-SCNC: 137 MMOL/L (ref 136–145)
TRIGL SERPL-MCNC: 284 MG/DL (ref 0–149)
TSH SERPL-ACNC: 1.14 MIU/L (ref 0.44–3.98)
VLDL: 57 MG/DL (ref 0–40)
WBC # BLD AUTO: 6.2 X10*3/UL (ref 4.4–11.3)

## 2024-03-12 PROCEDURE — 83036 HEMOGLOBIN GLYCOSYLATED A1C: CPT

## 2024-03-12 PROCEDURE — 85025 COMPLETE CBC W/AUTO DIFF WBC: CPT

## 2024-03-12 PROCEDURE — 80061 LIPID PANEL: CPT

## 2024-03-12 PROCEDURE — 82947 ASSAY GLUCOSE BLOOD QUANT: CPT

## 2024-03-12 PROCEDURE — 84443 ASSAY THYROID STIM HORMONE: CPT

## 2024-03-12 PROCEDURE — 80053 COMPREHEN METABOLIC PANEL: CPT

## 2024-03-12 PROCEDURE — 36415 COLL VENOUS BLD VENIPUNCTURE: CPT

## 2024-03-13 ENCOUNTER — TELEPHONE (OUTPATIENT)
Dept: CARDIOLOGY | Facility: CLINIC | Age: 54
End: 2024-03-13
Payer: MEDICARE

## 2024-03-13 NOTE — TELEPHONE ENCOUNTER
----- Message from Sasha Wagner sent at 3/13/2024  4:30 PM EDT -----  Regarding: Pt call.  Pt said he was ccalling you back.  Please return call.

## 2024-03-13 NOTE — TELEPHONE ENCOUNTER
I called and spoke with patient and went over lab results and recommendations. He denies questions.

## 2024-03-13 NOTE — TELEPHONE ENCOUNTER
----- Message from Steven Scherer MD sent at 3/13/2024 10:14 AM EDT -----  Labs ok except Triglycerides being high - Need to be on a low carb diet

## 2024-03-24 DIAGNOSIS — R12 HEARTBURN: ICD-10-CM

## 2024-03-25 RX ORDER — OMEPRAZOLE 20 MG/1
20 CAPSULE, DELAYED RELEASE ORAL DAILY
Qty: 90 CAPSULE | Refills: 0 | OUTPATIENT
Start: 2024-03-25

## 2024-04-11 ENCOUNTER — OFFICE VISIT (OUTPATIENT)
Dept: GASTROENTEROLOGY | Facility: CLINIC | Age: 54
End: 2024-04-11
Payer: MEDICARE

## 2024-04-11 VITALS
HEIGHT: 65 IN | SYSTOLIC BLOOD PRESSURE: 121 MMHG | OXYGEN SATURATION: 91 % | DIASTOLIC BLOOD PRESSURE: 87 MMHG | WEIGHT: 288 LBS | HEART RATE: 93 BPM | BODY MASS INDEX: 47.98 KG/M2

## 2024-04-11 DIAGNOSIS — R12 HEARTBURN: ICD-10-CM

## 2024-04-11 PROCEDURE — 1036F TOBACCO NON-USER: CPT | Performed by: PHYSICIAN ASSISTANT

## 2024-04-11 PROCEDURE — 3074F SYST BP LT 130 MM HG: CPT | Performed by: PHYSICIAN ASSISTANT

## 2024-04-11 PROCEDURE — 3079F DIAST BP 80-89 MM HG: CPT | Performed by: PHYSICIAN ASSISTANT

## 2024-04-11 PROCEDURE — 99213 OFFICE O/P EST LOW 20 MIN: CPT | Performed by: PHYSICIAN ASSISTANT

## 2024-04-11 RX ORDER — OMEPRAZOLE 20 MG/1
20 CAPSULE, DELAYED RELEASE ORAL DAILY
Qty: 90 CAPSULE | Refills: 3 | Status: SHIPPED | OUTPATIENT
Start: 2024-04-11

## 2024-04-11 NOTE — PATIENT INSTRUCTIONS
Thank you for coming in for your appointment.    -Year of omeprazole sent  -Get EGD done due to intestinal metaplasia on last EGD. Call with questions or concerns.

## 2024-04-11 NOTE — ASSESSMENT & PLAN NOTE
Well controlled with omeprazole 20mg daily. He did have some intestinal metaplasia on biopsies in 2021. Repeat EGD recommended to ensure no changes.

## 2024-04-11 NOTE — PROGRESS NOTES
Subjective   Patient ID: Beau Jackson is a 53 y.o. male who presents for Follow-up (Medication follow up /Last OV 12/2022/Colon 3/2021 with Wojtaeusebiok).    Patient's PCP is Dr. Vasquez    PMH: CAD s/p stent in 2018, HTN, STEFANIA    HPI  Patient states that he has had a long standing history of GERD. Patient has been doing well on omeprazole 20mg daily. He denies unexplained weight loss, dysphagia, N/V, abdominal pain, change in bowel habits, melena, hematochezia. No family history of GI malignancy.    Prior GI evaluation:  EGD September 2021: medium hiatal hernia. Chronic gastritis seen. Biopsies showed mild chronic gastritis without H. pylori, dysplasia, or malignancy. There was some intestinal metaplasia.  Colonoscopy March 2021: 1 hyperplastic colon polyp removed    Review of Systems:  Constitutional: No reported fever, chills, or weight loss.  GI: No reported abdominal pain, diarrhea, constipation, change in bowel habits, nausea, or vomiting.    Medications:  Prior to Admission medications    Medication Sig Start Date End Date Taking? Authorizing Provider   amLODIPine (Norvasc) 10 mg tablet Take 1 tablet (10 mg) by mouth once daily. 12/8/23 12/7/24  JUDIE Lima-CNP   aspirin 81 mg EC tablet Take 1 tablet (81 mg) by mouth once daily. 11/27/18   Historical Provider, MD   atorvastatin (Lipitor) 80 mg tablet Take 1 tablet (80 mg) by mouth once daily at bedtime. For: Hyperlipidemia 10/16/23   JUDIE Lima-CNP   budesonide-formoteroL (Symbicort) 160-4.5 mcg/actuation inhaler Inhale.    Historical Provider, MD   calcium carbonate-vitamin D3 600 mg-20 mcg (800 unit) tablet,chewable Chew twice a day.    Historical Provider, MD   citalopram (CeleXA) 20 mg tablet Take 1 tablet (20 mg) by mouth once daily. For: Anxiety disorder 11/28/17   Historical Provider, MD   cloNIDine (Catapres) 0.1 mg tablet Take 1 tablet (0.1 mg) by mouth twice a day. 12/8/17   Historical Provider, MD   dapagliflozin propanediol  (Farxiga) 5 mg Take 1 tablet (5 mg) by mouth once daily in the morning. For: Benign essential hypertension, CAD in native artery 12/28/23 9/23/24  JUDIE Lima-CNP   fluticasone (Flonase Allergy Relief) 50 mcg/actuation nasal spray Administer 2 sprays into each nostril once daily. 9/14/20   Historical Provider, MD   losartan-hydrochlorothiazide (Hyzaar) 100-25 mg tablet Take 1 tablet by mouth once daily. For: Benign essential hypertension 3/7/24   Steven Scherer MD   multivitamin (Daily Multi-Vitamin) tablet Take by mouth.    Historical Provider, MD   omeprazole (PriLOSEC) 20 mg DR capsule TAKE 1 CAPSULE BY MOUTH EVERY DAY 11/27/23   Ryanne Virk PA-C       Allergies:  Patient has no known allergies.    Past Medical History:  He has a past medical history of Personal history of other diseases of the circulatory system.    Past Surgical History:  He has a past surgical history that includes Total hip arthroplasty (11/02/2018) and Other surgical history (09/09/2019).    Social History:  He reports that he has quit smoking. His smoking use included cigarettes. He has never used smokeless tobacco. He reports that he does not drink alcohol and does not use drugs.    Objective   Physical exam:  Constitutional: Well developed, well nourished 53 y.o. year old in no acute distress.   Skin: Warm and dry. Normal turgor. No rash, ulcer, trauma, jaundice.   Eyes: Pupils symmetric and reactive to light.  Respiratory: Clear to auscultation bilaterally. No wheezes, rhonchi, or rales heard.  Cardiovascular: Regular rate and rhythm. S1 and S2 appreciated and normal. No murmur, rub, or gallop heard.   Edema: No edema noted.  GI: Normal bowel sounds. Soft, non-distended, non-tender. No rebound or guarding present. No hepatomegaly or splenomegaly appreciated. Abdominal aorta not palpably abnormal.  Musculoskeletal: Limbs and Joints grossly normal. Full range of motion in major joint.   Neuro: Alert and oriented x 3.  "Cranial nerves 2-12 grossly intact.   Psych: Normal mood and affect.        Relevant Results Recent labs reviewed in the EMR.  Lab Results   Component Value Date    HGB 17.5 03/12/2024    HGB 16.3 08/31/2021    HGB 15.9 10/27/2020    HGB 16.0 10/11/2019    MCV 88 03/12/2024    MCV 86 08/31/2021    MCV 88 10/27/2020    MCV 90 10/11/2019     03/12/2024     08/31/2021     10/27/2020     10/11/2019       No results found for: \"FERRITIN\", \"IRON\"    Lab Results   Component Value Date     03/12/2024    K 3.8 03/12/2024     03/12/2024    BUN 14 03/12/2024    CREATININE 0.85 03/12/2024       Lab Results   Component Value Date    BILITOT 0.9 03/12/2024     Lab Results   Component Value Date    ALT 35 03/12/2024    ALT 48 10/16/2023    ALT 48 03/17/2023    ALT 39 08/09/2022    ALT 52 03/18/2022    AST 33 03/12/2024    AST 36 10/16/2023    AST 40 (H) 03/17/2023    AST 29 08/09/2022    AST 40 (H) 03/18/2022    ALKPHOS 107 03/12/2024    ALKPHOS 118 10/16/2023    ALKPHOS 105 03/17/2023    ALKPHOS 99 08/09/2022    ALKPHOS 98 03/18/2022       No results found for: \"CRP\"    No results found for: \"CALPS\"    Radiology: Reviewed imaging reviewed in the EMR.  No results found.    Assessment/Plan   Problem List Items Addressed This Visit             ICD-10-CM    Heartburn R12     Well controlled with omeprazole 20mg daily. He did have some intestinal metaplasia on biopsies in 2021. Repeat EGD recommended to ensure no changes.         Relevant Medications    omeprazole (PriLOSEC) 20 mg DR capsule    Other Relevant Orders    EGD         Ryanne Virk PA-C    "

## 2024-04-18 ENCOUNTER — ANESTHESIA EVENT (OUTPATIENT)
Dept: OPERATING ROOM | Facility: HOSPITAL | Age: 54
End: 2024-04-18
Payer: MEDICARE

## 2024-04-18 RX ORDER — ALBUTEROL SULFATE 0.83 MG/ML
2.5 SOLUTION RESPIRATORY (INHALATION) ONCE
Status: CANCELLED | OUTPATIENT
Start: 2024-05-03

## 2024-04-18 RX ORDER — SODIUM CHLORIDE, SODIUM LACTATE, POTASSIUM CHLORIDE, CALCIUM CHLORIDE 600; 310; 30; 20 MG/100ML; MG/100ML; MG/100ML; MG/100ML
50 INJECTION, SOLUTION INTRAVENOUS CONTINUOUS
Status: CANCELLED | OUTPATIENT
Start: 2024-05-03

## 2024-04-18 RX ORDER — FAMOTIDINE 10 MG/ML
20 INJECTION INTRAVENOUS ONCE
Status: CANCELLED | OUTPATIENT
Start: 2024-04-18 | End: 2024-04-18

## 2024-04-18 RX ORDER — METOCLOPRAMIDE HYDROCHLORIDE 5 MG/ML
10 INJECTION INTRAMUSCULAR; INTRAVENOUS ONCE
Status: CANCELLED | OUTPATIENT
Start: 2024-04-18 | End: 2024-04-18

## 2024-04-25 ENCOUNTER — TELEPHONE (OUTPATIENT)
Dept: PREADMISSION TESTING | Facility: HOSPITAL | Age: 54
End: 2024-04-25
Payer: MEDICARE

## 2024-05-03 ENCOUNTER — ANESTHESIA (OUTPATIENT)
Dept: OPERATING ROOM | Facility: HOSPITAL | Age: 54
End: 2024-05-03
Payer: MEDICARE

## 2024-05-03 ENCOUNTER — APPOINTMENT (OUTPATIENT)
Dept: OPERATING ROOM | Facility: HOSPITAL | Age: 54
End: 2024-05-03
Payer: MEDICARE

## 2024-05-21 NOTE — ANESTHESIA PREPROCEDURE EVALUATION
Patient: Beau Jackson    Procedure Information       Date/Time: 05/30/24 8311    Scheduled providers: Nic Holbrook DO    Procedure: EGD    Location: [facility] OR            Relevant Problems   Anesthesia (within normal limits)      Cardiac   (+) Arteriosclerosis of coronary artery   (+) Benign essential hypertension   (+) CAD in native artery   (+) Hyperlipidemia   (+) Hypertriglyceridemia      Pulmonary   (+) Obstructive sleep apnea syndrome      Neuro   (+) Anxiety disorder      GI   (+) Gastroesophageal reflux disease      Musculoskeletal   (+) Chronic bilateral low back pain with sciatica       Clinical information reviewed:                   NPO Detail:  No data recorded     Physical Exam    Airway  Mallampati: III  TM distance: >3 FB  Neck ROM: full     Cardiovascular - normal exam  Rhythm: regular  Rate: normal     Dental - normal exam     Pulmonary - normal exam     Abdominal   (+) obese           Anesthesia Plan    History of general anesthesia?: yes  History of complications of general anesthesia?: no    ASA 3     MAC     The patient is not a current smoker.    intravenous induction   Anesthetic plan and risks discussed with patient.  Use of blood products discussed with patient who consented to blood products.    Plan discussed with CRNA.      
Preoccupations

## 2024-05-30 ENCOUNTER — HOSPITAL ENCOUNTER (OUTPATIENT)
Dept: OPERATING ROOM | Facility: HOSPITAL | Age: 54
Discharge: HOME | End: 2024-05-30
Payer: MEDICARE

## 2024-05-30 ENCOUNTER — HOSPITAL ENCOUNTER (OUTPATIENT)
Dept: CARDIOLOGY | Facility: HOSPITAL | Age: 54
Discharge: HOME | End: 2024-05-30
Payer: MEDICARE

## 2024-05-30 VITALS
HEART RATE: 76 BPM | OXYGEN SATURATION: 98 % | DIASTOLIC BLOOD PRESSURE: 79 MMHG | BODY MASS INDEX: 47.48 KG/M2 | SYSTOLIC BLOOD PRESSURE: 111 MMHG | HEIGHT: 65 IN | RESPIRATION RATE: 16 BRPM | TEMPERATURE: 97.2 F | WEIGHT: 285 LBS

## 2024-05-30 DIAGNOSIS — R12 HEARTBURN: ICD-10-CM

## 2024-05-30 LAB
ATRIAL RATE: 82 BPM
P AXIS: 17 DEGREES
PR INTERVAL: 151 MS
Q ONSET: 252 MS
QRS COUNT: 13 BEATS
QRS DURATION: 90 MS
QT INTERVAL: 366 MS
QTC CALCULATION(BAZETT): 428 MS
QTC FREDERICIA: 406 MS
R AXIS: -39 DEGREES
T AXIS: 32 DEGREES
T OFFSET: 435 MS
VENTRICULAR RATE: 82 BPM

## 2024-05-30 PROCEDURE — 7100000002 HC RECOVERY ROOM TIME - EACH INCREMENTAL 1 MINUTE: Performed by: NURSE ANESTHETIST, CERTIFIED REGISTERED

## 2024-05-30 PROCEDURE — 43239 EGD BIOPSY SINGLE/MULTIPLE: CPT | Performed by: INTERNAL MEDICINE

## 2024-05-30 PROCEDURE — 2500000005 HC RX 250 GENERAL PHARMACY W/O HCPCS: Performed by: NURSE ANESTHETIST, CERTIFIED REGISTERED

## 2024-05-30 PROCEDURE — 7100000010 HC PHASE TWO TIME - EACH INCREMENTAL 1 MINUTE: Performed by: NURSE ANESTHETIST, CERTIFIED REGISTERED

## 2024-05-30 PROCEDURE — 3600000002 HC OR TIME - INITIAL BASE CHARGE - PROCEDURE LEVEL TWO: Performed by: NURSE ANESTHETIST, CERTIFIED REGISTERED

## 2024-05-30 PROCEDURE — 2500000005 HC RX 250 GENERAL PHARMACY W/O HCPCS: Performed by: ANESTHESIOLOGY

## 2024-05-30 PROCEDURE — 2500000004 HC RX 250 GENERAL PHARMACY W/ HCPCS (ALT 636 FOR OP/ED): Performed by: ANESTHESIOLOGY

## 2024-05-30 PROCEDURE — 3600000007 HC OR TIME - EACH INCREMENTAL 1 MINUTE - PROCEDURE LEVEL TWO: Performed by: NURSE ANESTHETIST, CERTIFIED REGISTERED

## 2024-05-30 PROCEDURE — 3700000002 HC GENERAL ANESTHESIA TIME - EACH INCREMENTAL 1 MINUTE: Performed by: NURSE ANESTHETIST, CERTIFIED REGISTERED

## 2024-05-30 PROCEDURE — 7100000009 HC PHASE TWO TIME - INITIAL BASE CHARGE: Performed by: NURSE ANESTHETIST, CERTIFIED REGISTERED

## 2024-05-30 PROCEDURE — 93005 ELECTROCARDIOGRAM TRACING: CPT | Mod: 59

## 2024-05-30 PROCEDURE — 7100000001 HC RECOVERY ROOM TIME - INITIAL BASE CHARGE: Performed by: NURSE ANESTHETIST, CERTIFIED REGISTERED

## 2024-05-30 PROCEDURE — 3700000001 HC GENERAL ANESTHESIA TIME - INITIAL BASE CHARGE: Performed by: NURSE ANESTHETIST, CERTIFIED REGISTERED

## 2024-05-30 PROCEDURE — 2500000004 HC RX 250 GENERAL PHARMACY W/ HCPCS (ALT 636 FOR OP/ED): Performed by: NURSE ANESTHETIST, CERTIFIED REGISTERED

## 2024-05-30 RX ORDER — SODIUM CHLORIDE, SODIUM LACTATE, POTASSIUM CHLORIDE, CALCIUM CHLORIDE 600; 310; 30; 20 MG/100ML; MG/100ML; MG/100ML; MG/100ML
50 INJECTION, SOLUTION INTRAVENOUS CONTINUOUS
Status: DISCONTINUED | OUTPATIENT
Start: 2024-05-30 | End: 2024-05-31 | Stop reason: HOSPADM

## 2024-05-30 RX ORDER — LIDOCAINE HCL/PF 100 MG/5ML
SYRINGE (ML) INTRAVENOUS AS NEEDED
Status: DISCONTINUED | OUTPATIENT
Start: 2024-05-30 | End: 2024-05-30

## 2024-05-30 RX ORDER — METOCLOPRAMIDE HYDROCHLORIDE 5 MG/ML
10 INJECTION INTRAMUSCULAR; INTRAVENOUS ONCE
Status: COMPLETED | OUTPATIENT
Start: 2024-05-30 | End: 2024-05-30

## 2024-05-30 RX ORDER — PROPOFOL 10 MG/ML
INJECTION, EMULSION INTRAVENOUS AS NEEDED
Status: DISCONTINUED | OUTPATIENT
Start: 2024-05-30 | End: 2024-05-30

## 2024-05-30 RX ORDER — ALBUTEROL SULFATE 0.83 MG/ML
2.5 SOLUTION RESPIRATORY (INHALATION) ONCE
Status: DISCONTINUED | OUTPATIENT
Start: 2024-05-30 | End: 2024-05-30 | Stop reason: HOSPADM

## 2024-05-30 RX ORDER — FAMOTIDINE 10 MG/ML
20 INJECTION INTRAVENOUS ONCE
Status: COMPLETED | OUTPATIENT
Start: 2024-05-30 | End: 2024-05-30

## 2024-05-30 RX ORDER — FENTANYL CITRATE 50 UG/ML
INJECTION, SOLUTION INTRAMUSCULAR; INTRAVENOUS AS NEEDED
Status: DISCONTINUED | OUTPATIENT
Start: 2024-05-30 | End: 2024-05-30

## 2024-05-30 RX ADMIN — FENTANYL CITRATE 50 MCG: 50 INJECTION INTRAMUSCULAR; INTRAVENOUS at 09:03

## 2024-05-30 RX ADMIN — FAMOTIDINE 20 MG: 10 INJECTION, SOLUTION INTRAVENOUS at 08:01

## 2024-05-30 RX ADMIN — Medication 3 L/MIN: at 08:00

## 2024-05-30 RX ADMIN — LIDOCAINE HYDROCHLORIDE 100 MG: 20 INJECTION, SOLUTION INTRAVENOUS at 09:03

## 2024-05-30 RX ADMIN — SODIUM CHLORIDE, POTASSIUM CHLORIDE, SODIUM LACTATE AND CALCIUM CHLORIDE 50 ML/HR: 600; 310; 30; 20 INJECTION, SOLUTION INTRAVENOUS at 08:01

## 2024-05-30 RX ADMIN — METOCLOPRAMIDE 10 MG: 5 INJECTION, SOLUTION INTRAMUSCULAR; INTRAVENOUS at 08:01

## 2024-05-30 RX ADMIN — PROPOFOL 50 MG: 10 INJECTION, EMULSION INTRAVENOUS at 09:07

## 2024-05-30 RX ADMIN — PROPOFOL 100 MG: 10 INJECTION, EMULSION INTRAVENOUS at 09:03

## 2024-05-30 SDOH — HEALTH STABILITY: MENTAL HEALTH: CURRENT SMOKER: 0

## 2024-05-30 ASSESSMENT — PAIN SCALES - GENERAL
PAINLEVEL_OUTOF10: 0 - NO PAIN
PAIN_LEVEL: 0
PAINLEVEL_OUTOF10: 0 - NO PAIN

## 2024-05-30 ASSESSMENT — COLUMBIA-SUICIDE SEVERITY RATING SCALE - C-SSRS
2. HAVE YOU ACTUALLY HAD ANY THOUGHTS OF KILLING YOURSELF?: NO
6. HAVE YOU EVER DONE ANYTHING, STARTED TO DO ANYTHING, OR PREPARED TO DO ANYTHING TO END YOUR LIFE?: NO
1. IN THE PAST MONTH, HAVE YOU WISHED YOU WERE DEAD OR WISHED YOU COULD GO TO SLEEP AND NOT WAKE UP?: NO

## 2024-05-30 ASSESSMENT — PAIN - FUNCTIONAL ASSESSMENT
PAIN_FUNCTIONAL_ASSESSMENT: 0-10
PAIN_FUNCTIONAL_ASSESSMENT: 0-10

## 2024-05-30 NOTE — H&P
History Of Present Illness  Beau Jackson is a 53 y.o. male presenting for egd for eval of heartburn.     Past Medical History  Past Medical History:   Diagnosis Date    Personal history of other diseases of the circulatory system     History of coronary artery disease       Surgical History  Past Surgical History:   Procedure Laterality Date    OTHER SURGICAL HISTORY  09/09/2019    Coronary artery stent placement    TOTAL HIP ARTHROPLASTY  11/02/2018    Hip Replacement        Social History  He reports that he has quit smoking. His smoking use included cigarettes. He has never used smokeless tobacco. He reports that he does not drink alcohol and does not use drugs.    Family History  Family History   Problem Relation Name Age of Onset    Coronary artery disease Father          Allergies  Patient has no known allergies.    Review of Systems     Physical Exam  Constitutional:       Appearance: Normal appearance.   HENT:      Mouth/Throat:      Mouth: Mucous membranes are moist.   Eyes:      Extraocular Movements: Extraocular movements intact.   Cardiovascular:      Rate and Rhythm: Normal rate and regular rhythm.      Heart sounds: Normal heart sounds.   Pulmonary:      Effort: Pulmonary effort is normal. No respiratory distress.      Breath sounds: Normal breath sounds. No wheezing.   Abdominal:      General: Abdomen is flat. Bowel sounds are normal. There is no distension.      Palpations: Abdomen is soft.      Tenderness: There is no abdominal tenderness. There is no rebound.   Musculoskeletal:         General: Normal range of motion.   Skin:     General: Skin is warm and dry.   Neurological:      General: No focal deficit present.      Mental Status: He is alert and oriented to person, place, and time. Mental status is at baseline.   Psychiatric:         Mood and Affect: Mood normal.         Behavior: Behavior normal.          Last Recorded Vitals  Blood pressure (!) 136/93, pulse 84, temperature 36.4 °C  "(97.6 °F), temperature source Temporal, resp. rate 10, height 1.651 m (5' 5\"), weight 129 kg (285 lb), SpO2 93%.    Relevant Results        Current Outpatient Medications   Medication Instructions    amLODIPine (NORVASC) 10 mg, oral, Daily    aspirin 81 mg EC tablet 1 tablet, oral, Daily    atorvastatin (LIPITOR) 80 mg, oral, Nightly, For: Hyperlipidemia    budesonide-formoteroL (Symbicort) 160-4.5 mcg/actuation inhaler inhalation    citalopram (CeleXA) 20 mg tablet 1 tablet, oral, Daily, For: Anxiety disorder    dapagliflozin propanediol (FARXIGA) 5 mg, oral, Every morning, For: Benign essential hypertension, CAD in native artery    fluticasone (Flonase Allergy Relief) 50 mcg/actuation nasal spray 2 sprays, Each Nostril, Daily    losartan-hydrochlorothiazide (Hyzaar) 100-25 mg tablet 1 tablet, oral, Daily, For: Benign essential hypertension     multivitamin (Daily Multi-Vitamin) tablet oral    omeprazole (PRILOSEC) 20 mg, oral, Daily          Assessment/Plan   Active Problems:  There are no active Hospital Problems.  EGD for eval of reflux '    Risk and benefits of the endoscopic procedure including bleeding perforation and infection were discussed with patient and they wish to proceed          Nic Holbrook DO    "

## 2024-05-30 NOTE — LETTER
[facility]  [address] [id number] [phone]  FAX  [phone]      June 13, 2024     Beau Jackson  [address]  [place] [address]      Dear Beau:    Below are the results from your recent visit:    The biopsies from your recent endoscopy showed nonspecific irritation in the stomach (gastritis) with no concerning findings including no H pylori infection.     If you have any questions please call my office at [phone].    Sincerely,        Nic Holbrook, DO

## 2024-05-30 NOTE — ANESTHESIA POSTPROCEDURE EVALUATION
Patient: Beau Jackson    Procedure Summary       Date: 05/30/24 Room / Location: [facility] OR    Anesthesia Start: 0900 Anesthesia Stop: 0917    Procedure: EGD Diagnosis: Heartburn    Scheduled Providers: Nic Holbrook DO Responsible Provider: FESTUS Saunders    Anesthesia Type: MAC ASA Status: 2            Anesthesia Type: MAC    Vitals Value Taken Time   /79 05/30/24 0952   Temp 36.2 °C (97.2 °F) 05/30/24 0914   Pulse 76 05/30/24 0954   Resp 13 05/30/24 0955   SpO2 96 % 05/30/24 0954   Vitals shown include unfiled device data.    Anesthesia Post Evaluation    Patient location during evaluation: PACU  Patient participation: complete - patient participated  Level of consciousness: awake  Pain score: 0  Pain management: adequate  Airway patency: patent  Cardiovascular status: acceptable  Respiratory status: acceptable  Hydration status: acceptable  Postoperative Nausea and Vomiting: none    There were no known notable events for this encounter.

## 2024-06-13 ENCOUNTER — TELEPHONE (OUTPATIENT)
Dept: GASTROENTEROLOGY | Facility: CLINIC | Age: 54
End: 2024-06-13
Payer: MEDICARE

## 2024-06-13 LAB
LAB AP ASR DISCLAIMER: NORMAL
LABORATORY COMMENT REPORT: NORMAL
PATH REPORT.ADDENDUM SPEC: NORMAL
PATH REPORT.FINAL DX SPEC: NORMAL
PATH REPORT.GROSS SPEC: NORMAL
PATH REPORT.RELEVANT HX SPEC: NORMAL
PATH REPORT.TOTAL CANCER: NORMAL

## 2024-06-13 NOTE — RESULT ENCOUNTER NOTE
The biopsies from your recent endoscopy showed nonspecific irritation in the stomach (gastritis) with no concerning findings including no H pylori infection.    If you have any questions please call my office at [phone].

## 2024-06-13 NOTE — TELEPHONE ENCOUNTER
----- Message from Nic Holbrook DO sent at 6/13/2024 12:10 PM EDT -----    The biopsies from your recent endoscopy showed nonspecific irritation in the stomach (gastritis) with no concerning findings including no H pylori infection.    If you have any questions please call my office at [phone].

## 2024-06-21 DIAGNOSIS — E78.5 HYPERLIPIDEMIA, UNSPECIFIED HYPERLIPIDEMIA TYPE: ICD-10-CM

## 2024-06-24 RX ORDER — ATORVASTATIN CALCIUM 80 MG/1
80 TABLET, FILM COATED ORAL NIGHTLY
Qty: 90 TABLET | Refills: 2 | Status: SHIPPED | OUTPATIENT
Start: 2024-06-24

## 2024-06-29 ENCOUNTER — LAB (OUTPATIENT)
Dept: LAB | Facility: LAB | Age: 54
End: 2024-06-29
Payer: MEDICARE

## 2024-06-29 DIAGNOSIS — E11.9 TYPE 2 DIABETES MELLITUS WITHOUT COMPLICATIONS (MULTI): Primary | ICD-10-CM

## 2024-06-29 LAB
CHOLEST SERPL-MCNC: 112 MG/DL (ref 0–199)
CHOLESTEROL/HDL RATIO: 2.7
EST. AVERAGE GLUCOSE BLD GHB EST-MCNC: 137 MG/DL
HBA1C MFR BLD: 6.4 %
HDLC SERPL-MCNC: 42.1 MG/DL
LDLC SERPL CALC-MCNC: 50 MG/DL
NON HDL CHOLESTEROL: 70 MG/DL (ref 0–149)
TRIGL SERPL-MCNC: 102 MG/DL (ref 0–149)
VLDL: 20 MG/DL (ref 0–40)

## 2024-06-29 PROCEDURE — 83036 HEMOGLOBIN GLYCOSYLATED A1C: CPT

## 2024-06-29 PROCEDURE — 36415 COLL VENOUS BLD VENIPUNCTURE: CPT

## 2024-06-29 PROCEDURE — 80061 LIPID PANEL: CPT

## 2024-08-24 DIAGNOSIS — I10 BENIGN ESSENTIAL HYPERTENSION: ICD-10-CM

## 2024-08-26 DIAGNOSIS — I10 BENIGN ESSENTIAL HYPERTENSION: ICD-10-CM

## 2024-08-26 RX ORDER — LOSARTAN POTASSIUM AND HYDROCHLOROTHIAZIDE 25; 100 MG/1; MG/1
1 TABLET ORAL DAILY
Qty: 90 TABLET | Refills: 1 | OUTPATIENT
Start: 2024-08-26

## 2024-08-26 RX ORDER — LOSARTAN POTASSIUM AND HYDROCHLOROTHIAZIDE 25; 100 MG/1; MG/1
1 TABLET ORAL DAILY
Qty: 90 TABLET | Refills: 1 | Status: SHIPPED | OUTPATIENT
Start: 2024-08-26

## 2024-09-11 ENCOUNTER — TELEPHONE (OUTPATIENT)
Dept: CARDIOLOGY | Facility: HOSPITAL | Age: 54
End: 2024-09-11
Payer: MEDICARE

## 2024-09-12 NOTE — PROGRESS NOTES
"Primary Cardiologist: Dr. Scherer    Chief Complaint:   6 month follow up     History Of Present Illness:    Beau Jackson is a 54 y.o. male with past medical history of  CAD s/p PCI/stent RCA 11/08/2018, hyperlipidemia, GERD, HTN and STEFANIA (CPAP) who presents for 6 month follow up.     Today, Beau Jackson is feeling    Last Recorded Vitals:  Visit Vitals  /88 (BP Location: Left arm, Patient Position: Sitting, BP Cuff Size: Adult)   Pulse 106   Ht 1.651 m (5' 5\")   Wt 129 kg (285 lb)   SpO2 95%   BMI 47.43 kg/m²   Smoking Status Former   BSA 2.43 m²        Past Medical History:  He has a past medical history of Personal history of other diseases of the circulatory system.    Past Surgical History:  He has a past surgical history that includes Total hip arthroplasty (11/02/2018) and Other surgical history (09/09/2019).      Social History:  He reports that he has quit smoking. His smoking use included cigarettes. He has never used smokeless tobacco. He reports that he does not drink alcohol and does not use drugs.    Family History:  Family History   Problem Relation Name Age of Onset    Coronary artery disease Father          Allergies:  Patient has no known allergies.    Outpatient Medications:  Current Outpatient Medications   Medication Instructions    amLODIPine (NORVASC) 10 mg, oral, Daily    aspirin 81 mg EC tablet 1 tablet, oral, Daily    atorvastatin (LIPITOR) 80 mg, oral, Nightly    budesonide-formoteroL (Symbicort) 160-4.5 mcg/actuation inhaler inhalation    citalopram (CeleXA) 20 mg tablet 1 tablet, oral, Daily, For: Anxiety disorder    dapagliflozin propanediol (FARXIGA) 5 mg, oral, Every morning, For: Benign essential hypertension, CAD in native artery    fluticasone (Flonase Allergy Relief) 50 mcg/actuation nasal spray 2 sprays, Each Nostril, Daily    losartan-hydrochlorothiazide (Hyzaar) 100-25 mg tablet 1 tablet, oral, Daily, For: Benign essential hypertension     multivitamin (Daily " Multi-Vitamin) tablet oral    omeprazole (PRILOSEC) 20 mg, oral, Daily         Review of Systems   Constitutional: Negative for malaise/fatigue.   HENT: Negative.     Eyes: Negative.    Cardiovascular:  Negative for chest pain, dyspnea on exertion, leg swelling, near-syncope, palpitations and syncope.   Respiratory:  Negative for shortness of breath.    Endocrine: Negative.    Hematologic/Lymphatic: Negative.    Skin: Negative.    Musculoskeletal:  Positive for arthritis and back pain.   Gastrointestinal: Negative.    Genitourinary: Negative.    Neurological:  Negative for dizziness and light-headedness.   Psychiatric/Behavioral: Negative.     Allergic/Immunologic: Negative.         Physical Exam  Vitals reviewed.   Constitutional:       Appearance: Normal appearance. He is obese.   HENT:      Head: Normocephalic.      Mouth/Throat:      Mouth: Mucous membranes are moist.   Cardiovascular:      Rate and Rhythm: Regular rhythm. Tachycardia present.      Pulses: Normal pulses.      Heart sounds: Normal heart sounds.   Pulmonary:      Effort: Pulmonary effort is normal.      Breath sounds: Normal breath sounds. No wheezing, rhonchi or rales.   Abdominal:      General: Bowel sounds are normal. There is no distension.      Palpations: Abdomen is soft.      Tenderness: There is no abdominal tenderness.   Musculoskeletal:      Cervical back: Normal range of motion.      Right lower leg: No edema.      Left lower leg: No edema.   Skin:     General: Skin is warm and dry.   Neurological:      General: No focal deficit present.      Mental Status: He is alert and oriented to person, place, and time.   Psychiatric:         Mood and Affect: Mood normal.         Behavior: Behavior normal.           Last Labs:  CBC -  Lab Results   Component Value Date    WBC 6.2 03/12/2024    HGB 17.5 03/12/2024    HCT 50.1 03/12/2024    MCV 88 03/12/2024     03/12/2024       CMP -  Lab Results   Component Value Date    CALCIUM 9.6  "03/12/2024    PROT 7.5 03/12/2024    ALBUMIN 4.3 03/12/2024    AST 33 03/12/2024    ALT 35 03/12/2024    ALKPHOS 107 03/12/2024    BILITOT 0.9 03/12/2024       LIPID PANEL -   Lab Results   Component Value Date    CHOL 112 06/29/2024    TRIG 102 06/29/2024    HDL 42.1 06/29/2024    CHHDL 2.7 06/29/2024    LDLF 46 03/17/2023    VLDL 20 06/29/2024    NHDL 70 06/29/2024       RENAL FUNCTION PANEL -   Lab Results   Component Value Date    GLUCOSE 144 (H) 03/12/2024     03/12/2024    K 3.8 03/12/2024     03/12/2024    CO2 25 03/12/2024    ANIONGAP 16 03/12/2024    BUN 14 03/12/2024    CREATININE 0.85 03/12/2024    GFRMALE 88 03/17/2023    CALCIUM 9.6 03/12/2024    ALBUMIN 4.3 03/12/2024        Lab Results   Component Value Date    HGBA1C 6.4 (H) 06/29/2024       Last Cardiology Tests:  ECG:  No results found for this or any previous visit from the past 1095 days.      Echo:  No results found for this or any previous visit from the past 1095 days.      Ejection Fractions:  No results found for: \"EF\"    Cath:  11/08/2018 - Left Heart Catheterization  1. Single vessel coronary artery disease without proximal left anterior descending involvement.  2. Culprit vessel(s): right coronary artery.  3. Successful PTCA/stent of RCA.   Stress Test:      Cardiac Imaging:  No results found for this or any previous visit from the past 1095 days.        Lab review: I have personally reviewed the laboratory result(s)     Assessment/Plan     Beau Jackson is a 54 y.o. male with past medical history of  CAD s/p PCI/stent RCA 11/08/2018, hyperlipidemia, GERD, HTN and STEFANIA (CPAP) who presents for 6 month follow up.     1) CAD   s/p PCI of RCA Nov. 2018  Today, patient denies chest pain or pressure. Reports he is trying to increase exercise with walking daily.   LDL 50 (6/2024)  Blood pressure is elevated today, reports he has not taken his morning medications because he was running late today  Continue aspirin 81 mg daily, " atorvastatin 80 mg daily  Brilinta previously discontinued   Will start on carvedilol 3.125 mg twice daily for elevated blood pressure, HR in low 100s  (regular rhythm), patient previously on Verapamil-no longer taking for unknown reason per patient.       2) Hypertension  Today's /88  Continue amlodipine 10 mg daily, losartan-HCTZ 100-25 mg daily  Will start on carvedilol 3.125 mg BID today.  Follow up labs ordered prior to patient's next appt with Dr. Scherer    3) Hyperlipidemia  Goal LDL <70, LDL 50 (6/2024), at goal  Continue atorvastatin 80 mg daily      4. Type 2 DM  A1c 6/24 of 6.4  Continue on Farxiga  Management per PCP      JUDIE Fairchild-CNP

## 2024-09-13 ENCOUNTER — OFFICE VISIT (OUTPATIENT)
Dept: CARDIOLOGY | Facility: HOSPITAL | Age: 54
End: 2024-09-13
Payer: MEDICARE

## 2024-09-13 ENCOUNTER — APPOINTMENT (OUTPATIENT)
Dept: CARDIOLOGY | Facility: CLINIC | Age: 54
End: 2024-09-13
Payer: MEDICARE

## 2024-09-13 VITALS
WEIGHT: 285 LBS | HEART RATE: 106 BPM | HEIGHT: 65 IN | OXYGEN SATURATION: 95 % | BODY MASS INDEX: 47.48 KG/M2 | DIASTOLIC BLOOD PRESSURE: 88 MMHG | SYSTOLIC BLOOD PRESSURE: 140 MMHG

## 2024-09-13 DIAGNOSIS — E78.1 HYPERTRIGLYCERIDEMIA: Chronic | ICD-10-CM

## 2024-09-13 DIAGNOSIS — E11.9 DIABETES MELLITUS TYPE II, NON INSULIN DEPENDENT (MULTI): ICD-10-CM

## 2024-09-13 DIAGNOSIS — R93.1 ELEVATED CORONARY ARTERY CALCIUM SCORE: ICD-10-CM

## 2024-09-13 DIAGNOSIS — I10 BENIGN ESSENTIAL HYPERTENSION: ICD-10-CM

## 2024-09-13 DIAGNOSIS — Z98.61 STATUS POST PERCUTANEOUS TRANSLUMINAL CORONARY ANGIOPLASTY: ICD-10-CM

## 2024-09-13 DIAGNOSIS — E78.5 HYPERLIPIDEMIA, UNSPECIFIED HYPERLIPIDEMIA TYPE: ICD-10-CM

## 2024-09-13 DIAGNOSIS — E78.2 MIXED HYPERLIPIDEMIA: Primary | ICD-10-CM

## 2024-09-13 DIAGNOSIS — I25.10 CAD IN NATIVE ARTERY: ICD-10-CM

## 2024-09-13 DIAGNOSIS — I25.10 ARTERIOSCLEROSIS OF CORONARY ARTERY: ICD-10-CM

## 2024-09-13 PROCEDURE — 3044F HG A1C LEVEL LT 7.0%: CPT | Performed by: CLINICAL NURSE SPECIALIST

## 2024-09-13 PROCEDURE — 3079F DIAST BP 80-89 MM HG: CPT | Performed by: CLINICAL NURSE SPECIALIST

## 2024-09-13 PROCEDURE — 3008F BODY MASS INDEX DOCD: CPT | Performed by: CLINICAL NURSE SPECIALIST

## 2024-09-13 PROCEDURE — 99214 OFFICE O/P EST MOD 30 MIN: CPT | Performed by: CLINICAL NURSE SPECIALIST

## 2024-09-13 PROCEDURE — 1036F TOBACCO NON-USER: CPT | Performed by: CLINICAL NURSE SPECIALIST

## 2024-09-13 PROCEDURE — 3048F LDL-C <100 MG/DL: CPT | Performed by: CLINICAL NURSE SPECIALIST

## 2024-09-13 PROCEDURE — 3077F SYST BP >= 140 MM HG: CPT | Performed by: CLINICAL NURSE SPECIALIST

## 2024-09-13 RX ORDER — LOSARTAN POTASSIUM AND HYDROCHLOROTHIAZIDE 25; 100 MG/1; MG/1
1 TABLET ORAL DAILY
Qty: 90 TABLET | Refills: 3 | Status: SHIPPED | OUTPATIENT
Start: 2024-09-13 | End: 2025-09-13

## 2024-09-13 RX ORDER — CARVEDILOL 3.12 MG/1
3.12 TABLET ORAL
Qty: 180 TABLET | Refills: 3 | Status: SHIPPED | OUTPATIENT
Start: 2024-09-13 | End: 2025-09-13

## 2024-09-13 RX ORDER — AMLODIPINE BESYLATE 10 MG/1
10 TABLET ORAL DAILY
Qty: 90 TABLET | Refills: 3 | Status: SHIPPED | OUTPATIENT
Start: 2024-09-13 | End: 2025-09-13

## 2024-09-13 RX ORDER — ATORVASTATIN CALCIUM 80 MG/1
80 TABLET, FILM COATED ORAL NIGHTLY
Qty: 90 TABLET | Refills: 3 | Status: SHIPPED | OUTPATIENT
Start: 2024-09-13 | End: 2025-09-13

## 2024-09-13 ASSESSMENT — ENCOUNTER SYMPTOMS
NEAR-SYNCOPE: 0
ENDOCRINE NEGATIVE: 1
PALPITATIONS: 0
PSYCHIATRIC NEGATIVE: 1
BACK PAIN: 1
LIGHT-HEADEDNESS: 0
SHORTNESS OF BREATH: 0
EYES NEGATIVE: 1
DYSPNEA ON EXERTION: 0
HEMATOLOGIC/LYMPHATIC NEGATIVE: 1
SYNCOPE: 0
ALLERGIC/IMMUNOLOGIC NEGATIVE: 1
DIZZINESS: 0
GASTROINTESTINAL NEGATIVE: 1

## 2024-09-13 NOTE — PATIENT INSTRUCTIONS
Start taking carvedilol 3.125 mg twice daily with meals.  Labs have been ordered to be completed in 6 months prior to your next appt with Dr. Scherer  Call our office with any new cardiac concerns  Continue current medications  Continue heart-healthy diet. A diet low in sodium, low in cholesterol, limiting red meats and eating whole foods.   Follow up with Dr. Scherer in 6 months

## 2024-12-30 DIAGNOSIS — E78.1 HYPERTRIGLYCERIDEMIA: Chronic | ICD-10-CM

## 2025-01-03 NOTE — TELEPHONE ENCOUNTER
1/3/25  1711  Called patient; no answer. Left voice message for patient to have labs done for medication renewal.

## 2025-01-06 ENCOUNTER — TELEPHONE (OUTPATIENT)
Dept: CARDIOLOGY | Facility: HOSPITAL | Age: 55
End: 2025-01-06
Payer: MEDICARE

## 2025-01-06 DIAGNOSIS — E78.1 HYPERTRIGLYCERIDEMIA: Chronic | ICD-10-CM

## 2025-01-06 RX ORDER — DAPAGLIFLOZIN 5 MG/1
5 TABLET, FILM COATED ORAL DAILY
Qty: 90 TABLET | Refills: 0 | Status: SHIPPED | OUTPATIENT
Start: 2025-01-06 | End: 2025-04-06

## 2025-03-13 ENCOUNTER — TELEPHONE (OUTPATIENT)
Dept: CARDIOLOGY | Facility: HOSPITAL | Age: 55
End: 2025-03-13
Payer: MEDICARE

## 2025-03-13 ENCOUNTER — APPOINTMENT (OUTPATIENT)
Dept: CARDIOLOGY | Facility: HOSPITAL | Age: 55
End: 2025-03-13
Payer: MEDICARE

## 2025-03-13 NOTE — TELEPHONE ENCOUNTER
PT called and LVM stating that he will need to reschedule his appointment to a later date due to transportation issues.     Routing to care team to reschedule.

## 2025-03-13 NOTE — TELEPHONE ENCOUNTER
Received a message from Lupe about a VM that the patient stated he didn't have a ride and needed to reschedule his appointment.    Called adriana at this time to reschedule patient.     3/27/2025 Status: [facility]   Time: 12:30 PM       Patient expressed understanding    Thank you!  Tom CHAHAL

## 2025-03-14 NOTE — PROGRESS NOTES
"Counseling:  The patient was counseled regarding diagnostic results, instructions for management, risk factor reductions, prognosis, patient and family education, impressions, risks and benefits of treatment options and importance of compliance with treatment.      Chief Complaint:   The patient presents today for 6-month followup of HTN, CAD and hyperlipidemia.     History Of Present Illness:    Beau Jackson is a 54 year old male patient who presents today for 6-month followup of HTN, CAD and hyperlipidemia. His PMH is significant for CAD s/p PCI/stent RCA 11/08/2018, hyperlipidemia, GERD, HTN and STEFANIA (CPAP). Over the past 6 months, the patient states that he has done well from a cardiac standpoint. He denies any CP, chest discomfort or SOB. His only complaint is that of pharyngitis. BP has been stable. The patient is compliant with his prescribed medications.      Last Recorded Vitals:  Vitals:    03/27/25 1200   BP: 122/70   BP Location: Right arm   Pulse: 84   Weight: 132 kg (291 lb)   Height: 1.753 m (5' 9\")       Past Surgical History:  He has a past surgical history that includes Total hip arthroplasty (11/02/2018) and Other surgical history (09/09/2019).      Social History:  He reports that he has quit smoking. His smoking use included cigarettes. He has never used smokeless tobacco. He reports that he does not drink alcohol and does not use drugs.    Family History:  Family History   Problem Relation Name Age of Onset    Coronary artery disease Father          Allergies:  Patient has no known allergies.    Outpatient Medications:  Current Outpatient Medications   Medication Instructions    amLODIPine (NORVASC) 10 mg, oral, Daily    aspirin 81 mg EC tablet 1 tablet, Daily    atorvastatin (LIPITOR) 80 mg, oral, Nightly    budesonide-formoteroL (Symbicort) 160-4.5 mcg/actuation inhaler Inhale.    carvedilol (COREG) 3.125 mg, oral, 2 times daily (morning and late afternoon)    citalopram (CeleXA) 20 mg " tablet 1 tablet, Daily    dapagliflozin propanediol (FARXIGA) 5 mg, oral, Daily    fluticasone (Flonase Allergy Relief) 50 mcg/actuation nasal spray 2 sprays, Daily    losartan-hydrochlorothiazide (Hyzaar) 100-25 mg tablet 1 tablet, oral, Daily, For: Benign essential hypertension    multivitamin (Daily Multi-Vitamin) tablet Take by mouth.    omeprazole (PRILOSEC) 20 mg, oral, Daily     Review of Systems   HENT:  Positive for sore throat.    All other systems reviewed and are negative.     Physical Exam:  Constitutional:       Appearance: Healthy appearance. Not in distress.   Neck:      Vascular: No JVR. JVD normal.   Pulmonary:      Effort: Pulmonary effort is normal.      Breath sounds: Normal breath sounds. No wheezing. No rhonchi. No rales.   Chest:      Chest wall: Not tender to palpatation.   Cardiovascular:      PMI at left midclavicular line. Normal rate. Regular rhythm. Normal S1. Normal S2.       Murmurs: There is no murmur.      No gallop.  No click. No rub.   Pulses:     Intact distal pulses.   Edema:     Peripheral edema absent.   Abdominal:      General: Bowel sounds are normal.      Palpations: Abdomen is soft.      Tenderness: There is no abdominal tenderness.   Musculoskeletal: Normal range of motion.         General: No tenderness. Skin:     General: Skin is warm and dry.   Neurological:      General: No focal deficit present.      Mental Status: Alert and oriented to person, place and time.          Last Labs:  CBC -  Lab Results   Component Value Date    WBC 6.2 03/12/2024    HGB 17.5 03/12/2024    HCT 50.1 03/12/2024    MCV 88 03/12/2024     03/12/2024       CMP -  Lab Results   Component Value Date    CALCIUM 9.5 03/25/2025    PROT 7.3 03/25/2025    ALBUMIN 4.4 03/25/2025    AST 37 (H) 03/25/2025    ALT 45 03/25/2025    ALKPHOS 102 03/25/2025    BILITOT 0.7 03/25/2025       LIPID PANEL -   Lab Results   Component Value Date    CHOL 112 06/29/2024    TRIG 102 06/29/2024    HDL 42.1  06/29/2024    CHHDL 2.7 06/29/2024    LDLF 46 03/17/2023    VLDL 20 06/29/2024    NHDL 70 06/29/2024       RENAL FUNCTION PANEL -   Lab Results   Component Value Date    GLUCOSE 164 (H) 03/25/2025     03/25/2025    K 4.1 03/25/2025     03/25/2025    CO2 26 03/25/2025    ANIONGAP 11 03/25/2025    BUN 14 03/25/2025    CREATININE 0.88 03/25/2025    GFRMALE 88 03/17/2023    CALCIUM 9.5 03/25/2025    ALBUMIN 4.4 03/25/2025        Lab Results   Component Value Date    HGBA1C 6.4 (H) 06/29/2024       Last Cardiology Tests:  11/08/2018 - Left Heart Catheterization  1. Single vessel coronary artery disease without proximal left anterior descending involvement.  2. Culprit vessel(s): right coronary artery.  3. Successful PTCA/stent of RCA.     Lab review: I have personally reviewed the laboratory result(s).    Assessment/Plan   1) CAD s/p PCI of RCA Nov. 2018  On ASA 81 mg daily, atorvastatin 80 mg daily, amlodipine 10 mg daily, carvedilol 3.125 mg BID, losartan-HCTZ 100-25 mg daily.  Brilinta previously discontinued   Counselled about diet and exercise  Denies CP, chest discomfort or SOB  BP stable  Continue current medical Rx   Followup with Veda Estrada CNP, in 6 months    2) HTN  Stable  On amlodipine 10 mg daily, carvedilol 3.125 mg BID, losartan-HCTZ 100-25 mg daily  Continue current medical Rx   Followup with Veda Estrada CNP, in 6 months    3) Hyperlipidemia  Goal LDL <70  On atorvastatin 80 mg daily, Farxiga 5 mg daily   Advised on Mediterranean style diet   Lipid panel 06/29/2024 with LDL of 50  A1c 06/29/2024 of 6.4  Continue current medical Rx   Followup with Veda Estrada CNP, in 6 months    4) Pharyngitis   Rx provided for Zithromax       Scribe Attestation  By signing my name below, I, Ofelia Carmen   attest that this documentation has been prepared under the direction and in the presence of Steven Scherer MD.

## 2025-03-21 ENCOUNTER — DOCUMENTATION (OUTPATIENT)
Dept: CARDIOLOGY | Facility: HOSPITAL | Age: 55
End: 2025-03-21
Payer: MEDICARE

## 2025-03-21 ENCOUNTER — TELEPHONE (OUTPATIENT)
Dept: CARDIOLOGY | Facility: HOSPITAL | Age: 55
End: 2025-03-21
Payer: MEDICARE

## 2025-03-21 NOTE — TELEPHONE ENCOUNTER
Called pt to remind him of getting his lab work done before his appt w/. Pt expressed he has had a sore throat for a significant amount of time and OTC is not working. Pt asking for antibiotic. I expressed that he should reach out to his PCP and he stated they have denied giving him any medication and to use OTC. I let pt know that I would let his PCP office know that he is still not feeling better. Encouraged him to reach out to PCP.    Thank you!  Ellie JONES

## 2025-03-26 LAB
ALBUMIN SERPL-MCNC: 4.4 G/DL (ref 3.6–5.1)
ALP SERPL-CCNC: 102 U/L (ref 35–144)
ALT SERPL-CCNC: 45 U/L (ref 9–46)
ANION GAP SERPL CALCULATED.4IONS-SCNC: 11 MMOL/L (CALC) (ref 7–17)
AST SERPL-CCNC: 37 U/L (ref 10–35)
BILIRUB SERPL-MCNC: 0.7 MG/DL (ref 0.2–1.2)
BUN SERPL-MCNC: 14 MG/DL (ref 7–25)
CALCIUM SERPL-MCNC: 9.5 MG/DL (ref 8.6–10.3)
CHLORIDE SERPL-SCNC: 100 MMOL/L (ref 98–110)
CO2 SERPL-SCNC: 26 MMOL/L (ref 20–32)
CREAT SERPL-MCNC: 0.88 MG/DL (ref 0.7–1.3)
EGFRCR SERPLBLD CKD-EPI 2021: 102 ML/MIN/1.73M2
GLUCOSE SERPL-MCNC: 164 MG/DL (ref 65–139)
MAGNESIUM SERPL-MCNC: 2.1 MG/DL (ref 1.5–2.5)
POTASSIUM SERPL-SCNC: 4.1 MMOL/L (ref 3.5–5.3)
PROT SERPL-MCNC: 7.3 G/DL (ref 6.1–8.1)
SODIUM SERPL-SCNC: 137 MMOL/L (ref 135–146)

## 2025-03-27 ENCOUNTER — OFFICE VISIT (OUTPATIENT)
Dept: CARDIOLOGY | Facility: HOSPITAL | Age: 55
End: 2025-03-27
Payer: MEDICARE

## 2025-03-27 VITALS
WEIGHT: 291 LBS | BODY MASS INDEX: 43.1 KG/M2 | SYSTOLIC BLOOD PRESSURE: 122 MMHG | HEIGHT: 69 IN | DIASTOLIC BLOOD PRESSURE: 70 MMHG | HEART RATE: 84 BPM

## 2025-03-27 DIAGNOSIS — I25.10 CAD IN NATIVE ARTERY: ICD-10-CM

## 2025-03-27 DIAGNOSIS — J02.9 PHARYNGITIS, UNSPECIFIED ETIOLOGY: Primary | ICD-10-CM

## 2025-03-27 DIAGNOSIS — E78.2 MIXED HYPERLIPIDEMIA: ICD-10-CM

## 2025-03-27 DIAGNOSIS — E78.5 HYPERLIPIDEMIA, UNSPECIFIED HYPERLIPIDEMIA TYPE: ICD-10-CM

## 2025-03-27 DIAGNOSIS — I25.10 ARTERIOSCLEROSIS OF CORONARY ARTERY: ICD-10-CM

## 2025-03-27 DIAGNOSIS — Z98.61 STATUS POST PERCUTANEOUS TRANSLUMINAL CORONARY ANGIOPLASTY: ICD-10-CM

## 2025-03-27 DIAGNOSIS — R93.1 ELEVATED CORONARY ARTERY CALCIUM SCORE: ICD-10-CM

## 2025-03-27 DIAGNOSIS — I10 BENIGN ESSENTIAL HYPERTENSION: ICD-10-CM

## 2025-03-27 DIAGNOSIS — E78.1 HYPERTRIGLYCERIDEMIA: Chronic | ICD-10-CM

## 2025-03-27 PROCEDURE — 99213 OFFICE O/P EST LOW 20 MIN: CPT | Performed by: INTERNAL MEDICINE

## 2025-03-27 PROCEDURE — 3074F SYST BP LT 130 MM HG: CPT | Performed by: INTERNAL MEDICINE

## 2025-03-27 PROCEDURE — 3008F BODY MASS INDEX DOCD: CPT | Performed by: INTERNAL MEDICINE

## 2025-03-27 PROCEDURE — 1036F TOBACCO NON-USER: CPT | Performed by: INTERNAL MEDICINE

## 2025-03-27 PROCEDURE — 3078F DIAST BP <80 MM HG: CPT | Performed by: INTERNAL MEDICINE

## 2025-03-27 RX ORDER — DAPAGLIFLOZIN 5 MG/1
5 TABLET, FILM COATED ORAL DAILY
Qty: 90 TABLET | Refills: 3 | Status: SHIPPED | OUTPATIENT
Start: 2025-03-27 | End: 2026-03-27

## 2025-03-27 RX ORDER — AZITHROMYCIN 500 MG/1
500 TABLET, FILM COATED ORAL DAILY
Qty: 3 TABLET | Refills: 0 | Status: SHIPPED | OUTPATIENT
Start: 2025-03-27 | End: 2025-03-30

## 2025-03-27 ASSESSMENT — ENCOUNTER SYMPTOMS
DEPRESSION: 0
SORE THROAT: 1
OCCASIONAL FEELINGS OF UNSTEADINESS: 0
LOSS OF SENSATION IN FEET: 0

## 2025-03-27 NOTE — PATIENT INSTRUCTIONS
Continue all current medications as prescribed. Dr. Scherer has prescribed you a course of antibiotics. Refills for Farxiga have been sent to your pharmacy.  Followup with Veda Estrada CNP, in 6 months.     If you have any questions or cardiac concerns, please call our office at [phone].

## 2025-03-30 DIAGNOSIS — R12 HEARTBURN: ICD-10-CM

## 2025-03-31 RX ORDER — OMEPRAZOLE 20 MG/1
20 CAPSULE, DELAYED RELEASE ORAL DAILY
Qty: 30 CAPSULE | Refills: 1 | Status: SHIPPED | OUTPATIENT
Start: 2025-03-31

## 2025-04-03 ENCOUNTER — TELEPHONE (OUTPATIENT)
Dept: CARDIOLOGY | Facility: HOSPITAL | Age: 55
End: 2025-04-03
Payer: MEDICARE

## 2025-04-03 NOTE — TELEPHONE ENCOUNTER
RN advised pt to follow up [facility] PCP a this time due to still having a sore throat after being on antibiotics. Pt verbalized understanding,

## 2025-04-03 NOTE — TELEPHONE ENCOUNTER
Patient called in asking for refills on his medications for his sore throat. Patient stated that he took them and they didn't help.    Routed to Martine VERDIN for support.    Thank you!  Tom CHAHAL

## 2025-04-30 NOTE — PROGRESS NOTES
Subjective   Patient ID: Beau Jackson is a 54 y.o. male who presents for Med Management (On omeprazole/EGD 2024, colon 2021).    PMH: CAD s/p stent in 2018, HTN, STEFANIA    HPI  Patient states that he has had a long standing history of GERD. He was last seen in April 2024. Patient states that he is doing well. He denies unexplained weight loss, dysphagia, N/V, abdominal pain, melena, hematochezia.     Prior GI evaluation:  EGD September 2021: medium hiatal hernia. Chronic gastritis seen. Biopsies showed mild chronic gastritis without H. pylori, dysplasia, or malignancy. There was some intestinal metaplasia.  Colonoscopy March 2021: 1 hyperplastic colon polyp removed    Review of Systems:  Constitutional: No reported fever, chills, or weight loss.  GI: No reported abdominal pain, diarrhea, constipation, change in bowel habits, nausea, or vomiting.    Medications:  Prior to Admission medications    Medication Sig Start Date End Date Taking? Authorizing Provider   amLODIPine (Norvasc) 10 mg tablet Take 1 tablet (10 mg) by mouth once daily. 12/8/23 12/7/24  JUDIE Lima-CNP   aspirin 81 mg EC tablet Take 1 tablet (81 mg) by mouth once daily. 11/27/18   Historical Provider, MD   atorvastatin (Lipitor) 80 mg tablet Take 1 tablet (80 mg) by mouth once daily at bedtime. For: Hyperlipidemia 10/16/23   JUDIE Lima-CNP   budesonide-formoteroL (Symbicort) 160-4.5 mcg/actuation inhaler Inhale.    Historical Provider, MD   calcium carbonate-vitamin D3 600 mg-20 mcg (800 unit) tablet,chewable Chew twice a day.    Historical Provider, MD   citalopram (CeleXA) 20 mg tablet Take 1 tablet (20 mg) by mouth once daily. For: Anxiety disorder 11/28/17   Historical Provider, MD   cloNIDine (Catapres) 0.1 mg tablet Take 1 tablet (0.1 mg) by mouth twice a day. 12/8/17   Historical Provider, MD   dapagliflozin propanediol (Farxiga) 5 mg Take 1 tablet (5 mg) by mouth once daily in the morning. For: Benign essential  hypertension, CAD in native artery 12/28/23 9/23/24  Otilia Gilliam APRN-CNP   fluticasone (Flonase Allergy Relief) 50 mcg/actuation nasal spray Administer 2 sprays into each nostril once daily. 9/14/20   Historical Provider, MD   losartan-hydrochlorothiazide (Hyzaar) 100-25 mg tablet Take 1 tablet by mouth once daily. For: Benign essential hypertension 3/7/24   Steven Scherer MD   multivitamin (Daily Multi-Vitamin) tablet Take by mouth.    Historical Provider, MD   omeprazole (PriLOSEC) 20 mg DR capsule TAKE 1 CAPSULE BY MOUTH EVERY DAY 11/27/23   Ryanne Virk PA-C       Allergies:  Patient has no known allergies.    Past Medical History:  He has a past medical history of Personal history of other diseases of the circulatory system.    Past Surgical History:  He has a past surgical history that includes Total hip arthroplasty (11/02/2018) and Other surgical history (09/09/2019).    Social History:  He reports that he has quit smoking. His smoking use included cigarettes. He has never used smokeless tobacco. He reports that he does not drink alcohol and does not use drugs.    Objective   Physical exam:  Constitutional: Well developed, well nourished 54 y.o. year old in no acute distress.   Skin: Warm and dry. Normal turgor. No rash, ulcer, trauma, jaundice.   Eyes: Pupils symmetric and reactive to light.  Respiratory: Clear to auscultation bilaterally. No wheezes, rhonchi, or rales heard.  Cardiovascular: Regular rate and rhythm. S1 and S2 appreciated and normal. No murmur, rub, or gallop heard.   Edema: No edema noted.  GI: Normal bowel sounds. Soft, non-distended, non-tender. No rebound or guarding present. No hepatomegaly or splenomegaly appreciated. Abdominal aorta not palpably abnormal.  Musculoskeletal: Limbs and Joints grossly normal. Full range of motion in major joint.   Neuro: Alert and oriented x 3. Cranial nerves 2-12 grossly intact.   Psych: Normal mood and affect.        Relevant Results Recent  "labs reviewed in the EMR.  Lab Results   Component Value Date    HGB 17.5 03/12/2024    HGB 16.3 08/31/2021    HGB 15.9 10/27/2020    HGB 16.0 10/11/2019    MCV 88 03/12/2024    MCV 86 08/31/2021    MCV 88 10/27/2020    MCV 90 10/11/2019     03/12/2024     08/31/2021     10/27/2020     10/11/2019       No results found for: \"FERRITIN\", \"IRON\"    Lab Results   Component Value Date     03/25/2025    K 4.1 03/25/2025     03/25/2025    BUN 14 03/25/2025    CREATININE 0.88 03/25/2025       Lab Results   Component Value Date    BILITOT 0.7 03/25/2025     Lab Results   Component Value Date    ALT 45 03/25/2025    ALT 35 03/12/2024    ALT 48 10/16/2023    ALT 48 03/17/2023    ALT 39 08/09/2022    ALT 52 03/18/2022    AST 37 (H) 03/25/2025    AST 33 03/12/2024    AST 36 10/16/2023    AST 40 (H) 03/17/2023    AST 29 08/09/2022    AST 40 (H) 03/18/2022    ALKPHOS 102 03/25/2025    ALKPHOS 107 03/12/2024    ALKPHOS 118 10/16/2023    ALKPHOS 105 03/17/2023    ALKPHOS 99 08/09/2022    ALKPHOS 98 03/18/2022       No results found for: \"CRP\"    No results found for: \"CALPS\"    Radiology: Reviewed imaging reviewed in the EMR.  No results found.    Assessment/Plan   Problem List Items Addressed This Visit           ICD-10-CM    Heartburn R12    Symptoms well controlled on daily omeprazole. Refills sent. Patient will follow up in 1 year or sooner if needed.          Relevant Medications    omeprazole (PriLOSEC) 20 mg DR capsule    Other Relevant Orders    Follow Up In Gastroenterology           Ryanne Virk PA-C    "

## 2025-05-01 ENCOUNTER — APPOINTMENT (OUTPATIENT)
Facility: CLINIC | Age: 55
End: 2025-05-01
Payer: MEDICARE

## 2025-05-01 VITALS
HEART RATE: 91 BPM | OXYGEN SATURATION: 93 % | DIASTOLIC BLOOD PRESSURE: 95 MMHG | BODY MASS INDEX: 43.55 KG/M2 | HEIGHT: 69 IN | WEIGHT: 294 LBS | SYSTOLIC BLOOD PRESSURE: 139 MMHG

## 2025-05-01 DIAGNOSIS — R12 HEARTBURN: ICD-10-CM

## 2025-05-01 PROCEDURE — 3075F SYST BP GE 130 - 139MM HG: CPT | Performed by: PHYSICIAN ASSISTANT

## 2025-05-01 PROCEDURE — 3080F DIAST BP >= 90 MM HG: CPT | Performed by: PHYSICIAN ASSISTANT

## 2025-05-01 PROCEDURE — 3008F BODY MASS INDEX DOCD: CPT | Performed by: PHYSICIAN ASSISTANT

## 2025-05-01 PROCEDURE — 1036F TOBACCO NON-USER: CPT | Performed by: PHYSICIAN ASSISTANT

## 2025-05-01 PROCEDURE — 99213 OFFICE O/P EST LOW 20 MIN: CPT | Performed by: PHYSICIAN ASSISTANT

## 2025-05-01 RX ORDER — OMEPRAZOLE 20 MG/1
20 CAPSULE, DELAYED RELEASE ORAL DAILY
Qty: 30 CAPSULE | Refills: 11 | Status: SHIPPED | OUTPATIENT
Start: 2025-05-01

## 2025-05-01 NOTE — PATIENT INSTRUCTIONS
Tom you for oming in for your appointment.    -Continue omeprazole. 1 year of refills sent.    Follow up in 1 year, or sooner if needed.  
no

## 2025-05-01 NOTE — ASSESSMENT & PLAN NOTE
Symptoms well controlled on daily omeprazole. Refills sent. Patient will follow up in 1 year or sooner if needed.

## 2025-06-18 DIAGNOSIS — I25.10 CAD IN NATIVE ARTERY: ICD-10-CM

## 2025-06-18 DIAGNOSIS — I10 BENIGN ESSENTIAL HYPERTENSION: ICD-10-CM

## 2025-06-18 DIAGNOSIS — I25.10 ARTERIOSCLEROSIS OF CORONARY ARTERY: ICD-10-CM

## 2025-06-27 RX ORDER — CARVEDILOL 3.12 MG/1
TABLET ORAL
Qty: 180 TABLET | Refills: 3 | Status: SHIPPED | OUTPATIENT
Start: 2025-06-27

## 2025-07-05 DIAGNOSIS — E78.5 HYPERLIPIDEMIA, UNSPECIFIED HYPERLIPIDEMIA TYPE: ICD-10-CM

## 2025-07-14 DIAGNOSIS — E78.2 MIXED HYPERLIPIDEMIA: Primary | ICD-10-CM

## 2025-07-15 RX ORDER — ATORVASTATIN CALCIUM 80 MG/1
80 TABLET, FILM COATED ORAL NIGHTLY
Qty: 90 TABLET | Refills: 3 | Status: SHIPPED | OUTPATIENT
Start: 2025-07-15 | End: 2025-07-16 | Stop reason: SDUPTHER

## 2025-07-16 DIAGNOSIS — E78.5 HYPERLIPIDEMIA, UNSPECIFIED HYPERLIPIDEMIA TYPE: ICD-10-CM

## 2025-07-16 RX ORDER — ATORVASTATIN CALCIUM 80 MG/1
80 TABLET, FILM COATED ORAL NIGHTLY
Qty: 90 TABLET | Refills: 3 | Status: SHIPPED | OUTPATIENT
Start: 2025-07-16

## 2025-08-26 ENCOUNTER — OFFICE VISIT (OUTPATIENT)
Dept: PULMONOLOGY | Facility: HOSPITAL | Age: 55
End: 2025-08-26
Payer: MEDICARE

## 2025-08-26 ENCOUNTER — HOSPITAL ENCOUNTER (OUTPATIENT)
Dept: RADIOLOGY | Facility: HOSPITAL | Age: 55
Discharge: HOME | End: 2025-08-26
Payer: MEDICARE

## 2025-08-26 VITALS
HEIGHT: 69 IN | DIASTOLIC BLOOD PRESSURE: 95 MMHG | SYSTOLIC BLOOD PRESSURE: 143 MMHG | OXYGEN SATURATION: 95 % | WEIGHT: 294 LBS | TEMPERATURE: 98.5 F | RESPIRATION RATE: 16 BRPM | BODY MASS INDEX: 43.55 KG/M2 | HEART RATE: 90 BPM

## 2025-08-26 DIAGNOSIS — R06.02 SHORTNESS OF BREATH: ICD-10-CM

## 2025-08-26 DIAGNOSIS — Z87.891 FORMER SMOKER: ICD-10-CM

## 2025-08-26 DIAGNOSIS — G47.33 OSA (OBSTRUCTIVE SLEEP APNEA): ICD-10-CM

## 2025-08-26 DIAGNOSIS — J44.9 CHRONIC OBSTRUCTIVE PULMONARY DISEASE, UNSPECIFIED COPD TYPE (MULTI): Primary | ICD-10-CM

## 2025-08-26 DIAGNOSIS — J30.9 ALLERGIC RHINITIS, UNSPECIFIED SEASONALITY, UNSPECIFIED TRIGGER: ICD-10-CM

## 2025-08-26 PROCEDURE — 3080F DIAST BP >= 90 MM HG: CPT | Performed by: NURSE PRACTITIONER

## 2025-08-26 PROCEDURE — 71046 X-RAY EXAM CHEST 2 VIEWS: CPT | Performed by: RADIOLOGY

## 2025-08-26 PROCEDURE — 71046 X-RAY EXAM CHEST 2 VIEWS: CPT

## 2025-08-26 PROCEDURE — 3008F BODY MASS INDEX DOCD: CPT | Performed by: NURSE PRACTITIONER

## 2025-08-26 PROCEDURE — 99204 OFFICE O/P NEW MOD 45 MIN: CPT | Performed by: NURSE PRACTITIONER

## 2025-08-26 PROCEDURE — 3077F SYST BP >= 140 MM HG: CPT | Performed by: NURSE PRACTITIONER

## 2025-08-26 PROCEDURE — 99213 OFFICE O/P EST LOW 20 MIN: CPT | Mod: 25

## 2025-08-26 RX ORDER — LORATADINE 10 MG/1
10 TABLET ORAL DAILY
Qty: 30 TABLET | Refills: 11 | Status: SHIPPED | OUTPATIENT
Start: 2025-08-26

## 2025-08-26 RX ORDER — METFORMIN HYDROCHLORIDE 500 MG/1
TABLET ORAL
COMMUNITY

## 2025-08-26 RX ORDER — ALBUTEROL SULFATE 90 UG/1
4 INHALANT RESPIRATORY (INHALATION) ONCE
Status: CANCELLED | OUTPATIENT
Start: 2025-08-26

## 2025-08-26 RX ORDER — UMECLIDINIUM BROMIDE AND VILANTEROL TRIFENATATE 62.5; 25 UG/1; UG/1
1 POWDER RESPIRATORY (INHALATION) DAILY
Qty: 60 EACH | Refills: 11 | Status: SHIPPED | OUTPATIENT
Start: 2025-08-26

## 2025-08-26 RX ORDER — ALBUTEROL SULFATE 90 UG/1
2 INHALANT RESPIRATORY (INHALATION) EVERY 4 HOURS PRN
Qty: 18 G | Refills: 11 | Status: SHIPPED | OUTPATIENT
Start: 2025-08-26

## 2025-08-26 RX ORDER — ALBUTEROL SULFATE 0.83 MG/ML
3 SOLUTION RESPIRATORY (INHALATION) ONCE
Status: CANCELLED | OUTPATIENT
Start: 2025-08-26 | End: 2025-08-26

## 2025-08-26 ASSESSMENT — ENCOUNTER SYMPTOMS
UNEXPECTED WEIGHT CHANGE: 0
FATIGUE: 0
COUGH: 1
SORE THROAT: 1
SHORTNESS OF BREATH: 1
FEVER: 0
CHILLS: 0
WHEEZING: 1
RHINORRHEA: 0

## 2025-08-27 ENCOUNTER — RESULTS FOLLOW-UP (OUTPATIENT)
Dept: PULMONOLOGY | Facility: HOSPITAL | Age: 55
End: 2025-08-27
Payer: MEDICARE

## 2025-08-30 LAB
A ALTERNATA IGE QN: <0.1 KU/L
A ALTERNATA IGE RAST: 0
A FUMIGATUS IGE QN: <0.1 KU/L
A FUMIGATUS IGE RAST: 0
A1AT PHENOTYP SERPL-IMP: NORMAL
BERMUDA GRASS IGE QN: 1.75 KU/L
BERMUDA GRASS IGE RAST: 2
BOXELDER IGE QN: 1.39 KU/L
BOXELDER IGE RAST: 2
C HERBARUM IGE QN: <0.1 KU/L
C HERBARUM IGE RAST: 0
CALIF WALNUT POLN IGE QN: 1.25 KU/L
CALIF WALNUT POLN IGE RAST: 2
CAT DANDER IGE QN: <0.1 KU/L
CAT DANDER IGE RAST: 0
CMN PIGWEED IGE QN: 1.07 KU/L
CMN PIGWEED IGE RAST: 2
COMMON RAGWEED IGE QN: 1.43 KU/L
COMMON RAGWEED IGE RAST: 2
COTTONWOOD IGE QN: 1.04 KU/L
COTTONWOOD IGE RAST: 2
D FARINAE IGE QN: 0.7 KU/L
D FARINAE IGE RAST: 2
D PTERONYSS IGE QN: 0.66 KU/L
D PTERONYSS IGE RAST: 1
DOG DANDER IGE QN: <0.1 KU/L
DOG DANDER IGE RAST: 0
IGE SERPL-ACNC: 186 KU/L
LONDON PLANE IGE QN: 1.45 KU/L
LONDON PLANE IGE RAST: 2
MOUSE URINE PROT IGE QN: <0.1 KU/L
MOUSE URINE PROT IGE RAST: 0
MT JUNIPER IGE QN: 0.81 KU/L
MT JUNIPER IGE RAST: 2
P NOTATUM IGE QN: <0.1 KU/L
P NOTATUM IGE RAST: 0
PECAN/HICK TREE IGE QN: 0.96 KU/L
PECAN/HICK TREE IGE RAST: 2
REF LAB TEST REF RANGE: NORMAL
ROACH IGE QN: 2.42 KU/L
ROACH IGE RAST: 2
SALTWORT IGE QN: 1.52 KU/L
SALTWORT IGE RAST: 2
SHEEP SORREL IGE QN: 1.63 KU/L
SHEEP SORREL IGE RAST: 2
SILVER BIRCH IGE QN: 0.93 KU/L
SILVER BIRCH IGE RAST: 2
TIMOTHY IGE QN: 1.68 KU/L
TIMOTHY IGE RAST: 2
WHITE ASH IGE QN: 1.36 KU/L
WHITE ASH IGE RAST: 2
WHITE ELM IGE QN: 1.29 KU/L
WHITE ELM IGE RAST: 2
WHITE MULBERRY IGE QN: 0.86 KU/L
WHITE MULBERRY IGE RAST: 2
WHITE OAK IGE QN: 1.2 KU/L
WHITE OAK IGE RAST: 2

## 2025-09-02 ENCOUNTER — APPOINTMENT (OUTPATIENT)
Dept: PRIMARY CARE | Facility: CLINIC | Age: 55
End: 2025-09-02
Payer: MEDICARE

## 2025-09-02 PROBLEM — Z00.00 HEALTHCARE MAINTENANCE: Status: ACTIVE | Noted: 2025-09-02

## 2025-09-02 SDOH — ECONOMIC STABILITY: FOOD INSECURITY: WITHIN THE PAST 12 MONTHS, YOU WORRIED THAT YOUR FOOD WOULD RUN OUT BEFORE YOU GOT MONEY TO BUY MORE.: NEVER TRUE

## 2025-09-02 SDOH — ECONOMIC STABILITY: FOOD INSECURITY: WITHIN THE PAST 12 MONTHS, THE FOOD YOU BOUGHT JUST DIDN'T LAST AND YOU DIDN'T HAVE MONEY TO GET MORE.: NEVER TRUE

## 2025-09-02 ASSESSMENT — ENCOUNTER SYMPTOMS
CARDIOVASCULAR NEGATIVE: 1
EYES NEGATIVE: 1
GASTROINTESTINAL NEGATIVE: 1
MUSCULOSKELETAL NEGATIVE: 1
PSYCHIATRIC NEGATIVE: 1
HEMATOLOGIC/LYMPHATIC NEGATIVE: 1
ENDOCRINE NEGATIVE: 1
RESPIRATORY NEGATIVE: 1
NEUROLOGICAL NEGATIVE: 1
CONSTITUTIONAL NEGATIVE: 1

## 2025-09-02 ASSESSMENT — ANXIETY QUESTIONNAIRES
3. WORRYING TOO MUCH ABOUT DIFFERENT THINGS: NOT AT ALL
5. BEING SO RESTLESS THAT IT IS HARD TO SIT STILL: NOT AT ALL
2. NOT BEING ABLE TO STOP OR CONTROL WORRYING: NOT AT ALL
6. BECOMING EASILY ANNOYED OR IRRITABLE: NOT AT ALL
IF YOU CHECKED OFF ANY PROBLEMS ON THIS QUESTIONNAIRE, HOW DIFFICULT HAVE THESE PROBLEMS MADE IT FOR YOU TO DO YOUR WORK, TAKE CARE OF THINGS AT HOME, OR GET ALONG WITH OTHER PEOPLE: NOT DIFFICULT AT ALL
1. FEELING NERVOUS, ANXIOUS, OR ON EDGE: NOT AT ALL
GAD7 TOTAL SCORE: 0
4. TROUBLE RELAXING: NOT AT ALL
7. FEELING AFRAID AS IF SOMETHING AWFUL MIGHT HAPPEN: NOT AT ALL

## 2025-09-02 ASSESSMENT — LIFESTYLE VARIABLES
HOW OFTEN DO YOU HAVE SIX OR MORE DRINKS ON ONE OCCASION: NEVER
AUDIT-C TOTAL SCORE: 1
HOW OFTEN DO YOU HAVE A DRINK CONTAINING ALCOHOL: MONTHLY OR LESS
HOW MANY STANDARD DRINKS CONTAINING ALCOHOL DO YOU HAVE ON A TYPICAL DAY: 1 OR 2
SKIP TO QUESTIONS 9-10: 1

## 2025-09-02 ASSESSMENT — PATIENT HEALTH QUESTIONNAIRE - PHQ9
2. FEELING DOWN, DEPRESSED OR HOPELESS: NOT AT ALL
1. LITTLE INTEREST OR PLEASURE IN DOING THINGS: NOT AT ALL
SUM OF ALL RESPONSES TO PHQ9 QUESTIONS 1 & 2: 0

## 2025-09-02 ASSESSMENT — PAIN SCALES - GENERAL: PAINLEVEL_OUTOF10: 0-NO PAIN

## 2025-09-29 ENCOUNTER — APPOINTMENT (OUTPATIENT)
Dept: CARDIOLOGY | Facility: HOSPITAL | Age: 55
End: 2025-09-29
Payer: MEDICARE

## 2025-11-25 ENCOUNTER — APPOINTMENT (OUTPATIENT)
Dept: PULMONOLOGY | Facility: HOSPITAL | Age: 55
End: 2025-11-25
Payer: MEDICARE

## 2026-03-02 ENCOUNTER — APPOINTMENT (OUTPATIENT)
Dept: PRIMARY CARE | Facility: CLINIC | Age: 56
End: 2026-03-02
Payer: MEDICARE